# Patient Record
Sex: MALE | Race: OTHER | NOT HISPANIC OR LATINO | URBAN - METROPOLITAN AREA
[De-identification: names, ages, dates, MRNs, and addresses within clinical notes are randomized per-mention and may not be internally consistent; named-entity substitution may affect disease eponyms.]

---

## 2017-02-02 ENCOUNTER — INPATIENT (INPATIENT)
Facility: HOSPITAL | Age: 62
LOS: 14 days | Discharge: HOME | DRG: 217 | End: 2017-02-17
Attending: THORACIC SURGERY (CARDIOTHORACIC VASCULAR SURGERY) | Admitting: INTERNAL MEDICINE
Payer: MEDICAID

## 2017-02-02 VITALS
OXYGEN SATURATION: 96 % | SYSTOLIC BLOOD PRESSURE: 114 MMHG | HEIGHT: 66 IN | RESPIRATION RATE: 26 BRPM | DIASTOLIC BLOOD PRESSURE: 80 MMHG | WEIGHT: 160.06 LBS | HEART RATE: 95 BPM

## 2017-02-02 DIAGNOSIS — I35.1 NONRHEUMATIC AORTIC (VALVE) INSUFFICIENCY: ICD-10-CM

## 2017-02-02 DIAGNOSIS — I50.23 ACUTE ON CHRONIC SYSTOLIC (CONGESTIVE) HEART FAILURE: ICD-10-CM

## 2017-02-02 DIAGNOSIS — I50.21 ACUTE SYSTOLIC (CONGESTIVE) HEART FAILURE: ICD-10-CM

## 2017-02-02 LAB
ALBUMIN SERPL ELPH-MCNC: 3.6 G/DL — SIGNIFICANT CHANGE UP (ref 3.3–5.2)
ALP SERPL-CCNC: 130 U/L — HIGH (ref 40–120)
ALT FLD-CCNC: 338 U/L — HIGH
ANION GAP SERPL CALC-SCNC: 12 MMOL/L — SIGNIFICANT CHANGE UP (ref 5–17)
APTT BLD: 39.6 SEC — HIGH (ref 27.5–37.4)
AST SERPL-CCNC: 101 U/L — HIGH
BASOPHILS # BLD AUTO: 0 K/UL — SIGNIFICANT CHANGE UP (ref 0–0.2)
BASOPHILS NFR BLD AUTO: 0.2 % — SIGNIFICANT CHANGE UP (ref 0–2)
BILIRUB SERPL-MCNC: 1.3 MG/DL — SIGNIFICANT CHANGE UP (ref 0.4–2)
BUN SERPL-MCNC: 15 MG/DL — SIGNIFICANT CHANGE UP (ref 8–20)
CALCIUM SERPL-MCNC: 9.5 MG/DL — SIGNIFICANT CHANGE UP (ref 8.6–10.2)
CHLORIDE SERPL-SCNC: 106 MMOL/L — SIGNIFICANT CHANGE UP (ref 98–107)
CO2 SERPL-SCNC: 22 MMOL/L — SIGNIFICANT CHANGE UP (ref 22–29)
CREAT SERPL-MCNC: 0.88 MG/DL — SIGNIFICANT CHANGE UP (ref 0.5–1.3)
EOSINOPHIL # BLD AUTO: 0.1 K/UL — SIGNIFICANT CHANGE UP (ref 0–0.5)
EOSINOPHIL NFR BLD AUTO: 1.7 % — SIGNIFICANT CHANGE UP (ref 0–5)
GLUCOSE SERPL-MCNC: 90 MG/DL — SIGNIFICANT CHANGE UP (ref 70–115)
HCT VFR BLD CALC: 41.2 % — LOW (ref 42–52)
HGB BLD-MCNC: 13.4 G/DL — LOW (ref 14–18)
INR BLD: 1.16 RATIO — SIGNIFICANT CHANGE UP (ref 0.88–1.16)
LYMPHOCYTES # BLD AUTO: 1.2 K/UL — SIGNIFICANT CHANGE UP (ref 1–4.8)
LYMPHOCYTES # BLD AUTO: 22.3 % — SIGNIFICANT CHANGE UP (ref 20–55)
MAGNESIUM SERPL-MCNC: 1.9 MG/DL — SIGNIFICANT CHANGE UP (ref 1.8–2.5)
MCHC RBC-ENTMCNC: 29.9 PG — SIGNIFICANT CHANGE UP (ref 27–31)
MCHC RBC-ENTMCNC: 32.5 G/DL — SIGNIFICANT CHANGE UP (ref 32–36)
MCV RBC AUTO: 92 FL — SIGNIFICANT CHANGE UP (ref 80–94)
MONOCYTES # BLD AUTO: 0.6 K/UL — SIGNIFICANT CHANGE UP (ref 0–0.8)
MONOCYTES NFR BLD AUTO: 10.7 % — HIGH (ref 3–10)
NEUTROPHILS # BLD AUTO: 3.3 K/UL — SIGNIFICANT CHANGE UP (ref 1.8–8)
NEUTROPHILS NFR BLD AUTO: 64.9 % — SIGNIFICANT CHANGE UP (ref 37–73)
NT-PROBNP SERPL-SCNC: 3089 PG/ML — HIGH (ref 0–300)
PHOSPHATE SERPL-MCNC: 5 MG/DL — HIGH (ref 2.4–4.7)
PLATELET # BLD AUTO: 98 K/UL — LOW (ref 150–400)
POTASSIUM SERPL-MCNC: 4.4 MMOL/L — SIGNIFICANT CHANGE UP (ref 3.5–5.3)
POTASSIUM SERPL-SCNC: 4.4 MMOL/L — SIGNIFICANT CHANGE UP (ref 3.5–5.3)
PROT SERPL-MCNC: 6.5 G/DL — LOW (ref 6.6–8.7)
PROTHROM AB SERPL-ACNC: 12.8 SEC — SIGNIFICANT CHANGE UP (ref 10–13.1)
RBC # BLD: 4.48 M/UL — LOW (ref 4.6–6.2)
RBC # FLD: 15.9 % — HIGH (ref 11–15.6)
SODIUM SERPL-SCNC: 140 MMOL/L — SIGNIFICANT CHANGE UP (ref 135–145)
WBC # BLD: 5.15 K/UL — SIGNIFICANT CHANGE UP (ref 4.8–10.8)
WBC # FLD AUTO: 5.15 K/UL — SIGNIFICANT CHANGE UP (ref 4.8–10.8)

## 2017-02-02 PROCEDURE — 93010 ELECTROCARDIOGRAM REPORT: CPT

## 2017-02-02 PROCEDURE — 93308 TTE F-UP OR LMTD: CPT | Mod: 26

## 2017-02-02 PROCEDURE — 71010: CPT | Mod: 26

## 2017-02-02 PROCEDURE — 99255 IP/OBS CONSLTJ NEW/EST HI 80: CPT

## 2017-02-02 RX ORDER — INFLUENZA VIRUS VACCINE 15; 15; 15; 15 UG/.5ML; UG/.5ML; UG/.5ML; UG/.5ML
0.5 SUSPENSION INTRAMUSCULAR ONCE
Qty: 0 | Refills: 0 | Status: COMPLETED | OUTPATIENT
Start: 2017-02-02 | End: 2017-02-02

## 2017-02-02 RX ORDER — ASPIRIN/CALCIUM CARB/MAGNESIUM 324 MG
81 TABLET ORAL DAILY
Qty: 0 | Refills: 0 | Status: DISCONTINUED | OUTPATIENT
Start: 2017-02-02 | End: 2017-02-05

## 2017-02-02 RX ORDER — PANTOPRAZOLE SODIUM 20 MG/1
40 TABLET, DELAYED RELEASE ORAL
Qty: 0 | Refills: 0 | Status: DISCONTINUED | OUTPATIENT
Start: 2017-02-02 | End: 2017-02-06

## 2017-02-02 RX ORDER — LISINOPRIL 2.5 MG/1
2.5 TABLET ORAL DAILY
Qty: 0 | Refills: 0 | Status: DISCONTINUED | OUTPATIENT
Start: 2017-02-02 | End: 2017-02-03

## 2017-02-02 RX ORDER — FUROSEMIDE 40 MG
40 TABLET ORAL EVERY 12 HOURS
Qty: 0 | Refills: 0 | Status: DISCONTINUED | OUTPATIENT
Start: 2017-02-02 | End: 2017-02-03

## 2017-02-02 RX ORDER — CARVEDILOL PHOSPHATE 80 MG/1
3.12 CAPSULE, EXTENDED RELEASE ORAL EVERY 12 HOURS
Qty: 0 | Refills: 0 | Status: DISCONTINUED | OUTPATIENT
Start: 2017-02-02 | End: 2017-02-04

## 2017-02-02 RX ADMIN — CARVEDILOL PHOSPHATE 3.12 MILLIGRAM(S): 80 CAPSULE, EXTENDED RELEASE ORAL at 17:28

## 2017-02-02 RX ADMIN — Medication 40 MILLIGRAM(S): at 17:28

## 2017-02-02 NOTE — H&P CARDIOLOGY - REVIEW OF SYSTEMS
General: + fatigue, no fevers/chills, no weight change  HEENT: No epistaxis, no tinnitus  GI: No nausea/vomiting, no black/bloody stools, no constipation/diarrhea  : No hematuria, no frequency  M/S: No myalgias, arthralgias, no swollen joints, no back pain  Heme: No bruising/bleeding problems, no anemia, no coagulation disorders  Endo: No hot/cold intolerance, no polyuria, no polydypsia

## 2017-02-02 NOTE — H&P CARDIOLOGY - COMMENTS
60y/o male former smoker with no significant past medical history who went to [facility] for rapidly progressing SOB and occasional chest pain, (CCS class 3 anginal equivalent, NYHA class 3 heart failure).  He states BLE edema, > 3 pillow orthopnea, palpitations, and claudication.  He had SOB and chest pain while in [place] and went to a doctor who gave him a medication that he does not recall the name of.  [place] illness progressed [place] [facility].  Echo showed an EF of 15-20%. 60y/o male former smoker with no significant past medical history who went to [facility] for rapidly progressing SOB and occasional chest pain, (CCS class 3 anginal equivalent, NYHA class 3 heart failure).  He states BLE edema, > 3 pillow orthopnea, palpitations, and claudication.  He had SOB and chest pain while in [place] and went to a doctor who gave him a medication that he does not recall the name of.  The illness progressed [place] to [facility].  Echo showed an EF of 15-20%.  1. LHC  2. RHC 60y/o male former smoker with no significant past medical history who went to [facility] for rapidly progressing SOB and occasional chest pain, (CCS class 3 anginal equivalent, NYHA class 3 heart failure).  He states BLE edema, > 3 pillow orthopnea, palpitations, and claudication.  He had SOB and chest pain while in [place] ([place] [facility]) and went to a doctor who gave him a medication that he does not recall the name of.  The illness progressed and [place] [facility].  Echo showed an EF of 15-20%.  1. Acute HFrEF: R&LHC, ACEI/ARB, beta blocker, diuretics  2. Elevated LFT's  3. Thrombocytopenia 62y/o male former smoker with no significant past medical history who went to [facility] for rapidly progressing SOB and occasional chest pain, (CCS class 3 anginal equivalent, NYHA class 3 heart failure).  He states BLE edema, > 3 pillow orthopnea, palpitations, and claudication.  He had SOB and chest pain while in [place] and went to a doctor who gave him a medication that he does not recall the name of.  The illness progressed and [place] [facility].  Echo showed an EF of 15-20%.  1. Acute HFrEF: R&LHC, ACEI/ARB, beta blocker, diuretics  2. Elevated LFT's (Resolving): Trend LFTs  3. Thrombocytopenia

## 2017-02-02 NOTE — H&P CARDIOLOGY - PMH
Aortic valve insufficiency, unspecified etiology    HFrEF (heart failure with reduced ejection fraction)    Mitral valve insufficiency, unspecified etiology

## 2017-02-02 NOTE — CONSULT NOTE ADULT - SUBJECTIVE AND OBJECTIVE BOX
Patient is a 61y old  Male who presents with a chief complaint of     HPI:  60y/o male former smoker with no significant past medical history who went to [facility] for rapidly progressing SOB and occasional chest pain, (CCS class 3 anginal equivalent, NYHA class 3 heart failure).  He states BLE edema, > 3 pillow orthopnea, palpitations, and claudication.  He had SOB and chest pain while in [place] and went to a doctor who gave him a medication that he does not recall the name of.  The illness progressed [place] [facility].  Echo showed an EF of 15-20%.    Echo: 2017       LVSF: Severely decreased with global hypokinesis and grade 1 LVDD       EF: 15-20%       RVSF: Normal       LA: Normal       RA: Normal       Mitral Valve: Mild to moderate eccentric regurgitation       Aortic Valve: Mild to moderate AR       Tricuspid Valve: Mild TR       Pulmonic Valve: Normal       Pericardium: No significant pericardial effusion (2017 13:07)      PAST MEDICAL & SURGICAL HISTORY:  Mitral valve insufficiency, unspecified etiology  Aortic valve insufficiency, unspecified etiology  HFrEF (heart failure with reduced ejection fraction)  No significant past surgical history      PREVIOUS DIAGNOSTIC TESTING:      ECHO  FINDINGS:  See above    Allergies    No Known Allergies    Intolerances        MEDICATIONS  (STANDING):  carvedilol 3.125milliGRAM(s) Oral every 12 hours  lisinopril 2.5milliGRAM(s) Oral daily  aspirin enteric coated 81milliGRAM(s) Oral daily  furosemide   Injectable 40milliGRAM(s) IV Push every 12 hours    MEDICATIONS  (PRN):    carvedilol 3.125milliGRAM(s) Oral every 12 hours  lisinopril 2.5milliGRAM(s) Oral daily  aspirin enteric coated 81milliGRAM(s) Oral daily  furosemide   Injectable 40milliGRAM(s) IV Push every 12 hours      FAMILY HISTORY:  No pertinent family history in first degree relatives      SOCIAL HISTORY:    CIGARETTES: Former    ALCOHOL: Moderate    REVIEW OF SYSTEMS:  CONSTITUTIONAL: No fever, weight loss, or fatigue  EYES: No eye pain, visual disturbances, or discharge  ENMT:  No difficulty hearing, tinnitus, vertigo; No sinus or throat pain  NECK: No pain or stiffness  RESPIRATORY: No cough, wheezing, chills or hemoptysis; No Shortness of Breath  CARDIOVASCULAR: No chest pain, palpitations, passing out, dizziness, or leg swelling  GASTROINTESTINAL: No abdominal or epigastric pain. No nausea, vomiting, or hematemesis; No diarrhea or constipation. No melena or hematochezia.  GENITOURINARY: No dysuria, frequency, hematuria, or incontinence  NEUROLOGICAL: No headaches, memory loss, loss of strength, numbness, or tremors  SKIN: No itching, burning, rashes, or lesions   LYMPH Nodes: No enlarged glands  ENDOCRINE: No heat or cold intolerance; No hair loss  MUSCULOSKELETAL: No joint pain or swelling; No muscle, back, or extremity pain  PSYCHIATRIC: No depression, anxiety, mood swings, or difficulty sleeping  HEME/LYMPH: No easy bruising, or bleeding gums  ALLERY AND IMMUNOLOGIC: No hives or eczema	    Vital Signs Last 24 Hrs  T(C): 36.4, Max: 36.4 ( @ 13:35)  T(F): 97.6, Max: 97.6 ( @ 13:35)  HR: 89 (89 - 95)  BP: 115/67 (114/80 - 127/62)  BP(mean): 91 (91 - 91)  RR: 18 (18 - 26)  SpO2: 98% (96% - 98%)    Daily Height in cm: 167.64 (2017 13:35)    Daily Weight in k.6 (2017 13:07)    I&O's Detail      PHYSICAL EXAM:  Appearance: Normal, well nourished	  HEENT:   Normal oral mucosa, PERRL, EOMI, sclera non-icteric	  Lymphatic: No cervical lymphadenopathy  Cardiovascular: Normal S1 S2, No JVD, No cardiac murmurs, No carotid bruits, No peripheral edema  Respiratory: Lungs clear to auscultation	  Psychiatry: A & O x 3, Mood & affect appropriate  Gastrointestinal:  Soft, Non-tender, + BS, no bruits	  Skin: No rashes, No ecchymoses, No cyanosis  Neurologic: Grossly non-focal with full strength in all four extremities  Extremities: Normal range of motion, No clubbing, cyanosis or edema  Vascular: Peripheral pulses palpable 2+ bilaterally      INTERPRETATION OF TELEMETRY:    ECG: NSR.  LVH.  NSST changes    LABS:                        13.4   5.15  )-----------( 98       ( 2017 13:52 )             41.2     2017 13:52    140    |  106    |  15.0   ----------------------------<  90     4.4     |  22.0   |  0.88     Ca    9.5        2017 13:52  Phos  5.0       2017 13:52  Mg     1.9       2017 13:52    TPro  6.5    /  Alb  3.6    /  TBili  1.3    /  DBili  x      /  AST  101    /  ALT  338    /  AlkPhos  130    2017 13:52        PT/INR - ( 2017 13:52 )   PT: 12.8 sec;   INR: 1.16 ratio         PTT - ( 2017 13:52 )  PTT:39.6 sec    I&O's Summary    BNPSerum Pro-Brain Natriuretic Peptide: 3089 pg/mL ( @ 13:52)    RADIOLOGY & ADDITIONAL STUDIES:    Assessment:  HPI:  60y/o male former smoker with no significant past medical history who went to [facility] for rapidly progressing SOB and occasional chest pain, (CCS class 3 anginal equivalent, NYHA class 3 heart failure).  He states BLE edema, > 3 pillow orthopnea, palpitations, and claudication.  He had SOB and chest pain while in [place] and went to a doctor who gave him a medication that he does not recall the name of.  [place] progressed [place] [facility].  Echo showed an EF of 15-20%.    Echo: 2017       LVSF: Severely decreased with global hypokinesis and grade 1 LVDD       EF: 15-20%       RVSF: Normal       LA: Normal       RA: Normal       Mitral Valve: Mild to moderate eccentric regurgitation       Aortic Valve: Mild to moderate AR       Tricuspid Valve: Mild TR       Pulmonic Valve: Normal       Pericardium: No significant pericardial effusion (2017 13:07)      Plan:  Right and left cardiac catheterization.  Cardiac catheterization and possible percutaneous intervention recommended.  Risks, benefits, and alternatives reviewed.  Risks including but not limited to MI, death, stroke, bleeding, infection, vessel injury, hematoma, renal failure, allergic reaction, urgent open heart surgery, restenosis and stent thrombosis were reviewed.  All questions answered.  Patient is agreeable to proceed.

## 2017-02-02 NOTE — PROGRESS NOTE ADULT - SUBJECTIVE AND OBJECTIVE BOX
I have erxamined the patient and  have explained risk and benefits . Consent has been attained . I agree with a cardiac catheterization.

## 2017-02-02 NOTE — H&P ADULT. - HISTORY OF PRESENT ILLNESS
is a 62y/o male former smoker with no significant past medical history who went to [facility] for rapidly progressing SOB and occasional chest pain, (CCS class 3 anginal equivalent, NYHA class 3 heart failure). He states BLE edema, > 3 pillow orthopnea, palpitations, and claudication.  He had SOB and chest pain while in [place] ([place] [facility]) and went to a doctor who gave him a medication that he does not recall the name of.  The illness progressed and [place] [facility]. His echo demonstrated a global hypokinesis and it showed an EF of 15-20%. He had a cath which demonstrated normal coronaries and he is being admitted for acute systolic heart failure.    On my exam, he's [place] speaking and appears very comfortable, lying flat in bed without oxygen, and no tachypnea present. The etiology of his cardiomyopathy is unknown.

## 2017-02-02 NOTE — H&P CARDIOLOGY - NEGATIVE NEUROLOGICAL SYMPTOMS
no paresthesias/no loss of consciousness/no transient paralysis/no loss of sensation/no generalized seizures/no hemiparesis/no difficulty walking/no focal seizures/no weakness/no vertigo/no tremors/no headache/no syncope/no facial palsy

## 2017-02-02 NOTE — CONSULT NOTE ADULT - SUBJECTIVE AND OBJECTIVE BOX
History of Present Illness:  61y Male former smoker with no significant past medical history who went to  for rapidly progressing SOB and occasional chest pain, (CCS class 3 anginal equivalent, NYHA class 3 heart failure). He states BLE edema, > 3 pillow orthopnea, palpitations, and claudication.  He had SOB and chest pain while in [place] and went to a doctor who gave him a medication that he does not recall the name of.  The illness progressed and [place] . His echo demonstrated a global hypokinesis and it showed an EF of 15-20%. He had a cath which demonstrated normal coronaries and he is being admitted for acute systolic heart failure.    Past Medical History  Mitral valve insufficiency, unspecified etiology  Aortic valve insufficiency, unspecified etiology  HFrEF (heart failure with reduced ejection fraction)      Past Surgical History  No significant past surgical history      MEDICATIONS  (STANDING):  carvedilol 3.125milliGRAM(s) Oral every 12 hours  lisinopril 2.5milliGRAM(s) Oral daily  aspirin enteric coated 81milliGRAM(s) Oral daily  furosemide   Injectable 40milliGRAM(s) IV Push every 12 hours  pantoprazole    Tablet 40milliGRAM(s) Oral before breakfast    MEDICATIONS  (PRN):    Antiplatelet therapy:                   Aspirin current        Last dose/amt:    Allergies: No Known Allergies      SOCIAL HISTORY:  Smoker: [ x] Yes  [ ] No        PACK YEARS:     30                WHEN QUIT?    ETOH use: [ ] Yes  [x ] No              FREQUENCY / QUANTITY:  Ilicit Drug use:  [ ] Yes  [x ] No  Occupation: "" in the [employer] business  Live with: Alone on the second floor of a house. Sons live nearby  Assist device use: None    Relevant Family History  FAMILY HISTORY:  No pertinent family history in first degree relatives, Father  young in an accident, Mother  of "old age"      Review of Systems  GENERAL:  Fevers[] chills[] sweats[] fatigue[x] weight loss[] weight gain []                                        NEURO:  parathesias[] seizures []  syncope []  confusion []           NEGATIVE                                                                       EYES: glasses[]  blurry vision[]  discharge[] pain[] glaucoma []         NEGATIVE                                                                   ENMT:  difficulty hearing []  vertigo[]  dysphagia[] epistaxis[] recent dental work []   NEGATIVE (Upper and lower dentures)                                   CV:  chest pain[x] palpitations[] DELCID [x] diaphoresis [] edema[]                                                                                             RESPIRATORY:  wheezing[] SOB[x] cough [] sputum[] hemoptysis[]                                                                    GI:  nausea[]  vomiting []  diarrhea[] constipation [] melena []            NEGATIVE                                                            : hematuria[ ]  dysuria[ ] urgency[] incontinence[]                            NEGATIVE                                                                  MUSKULOSKELETAL:  arthritis[ ]  joint swelling [ ] muscle weakness [ ]      NEGATIVE                                                            SKIN/BREAST:  rash[ ] itching [ ]  hair loss[ ] masses[ ]                                     NEGATIVE                                                           PSYCH:  dementia [ ] depresion [ ] anxiety[ ]                                                                NEGATIVE                                                  HEME/LYMPH:  bruises easily[ ] enlarged lymph nodes[ ] tender lymph nodes[ ]          NEGATIVE                                       ENDOCRINE:  cold intolerance[ ] heat intolerance[ ] polydipsia[ ]                                   NEGATIVE                                           PHYSICAL EXAM  Vital Signs Last 24 Hrs  T(C): 36.4, Max: 36.4 (- @ 13:35)  T(F): 97.6, Max: 97.6 ( @ 13:35)  HR: 92 (86 - 95)  BP: 128/76 (108/63 - 128/76)  BP(mean): 91 (91 - 91)  RR: 18 (18 - 26)  SpO2: 98% (96% - 98%)    Telemetry: SR with frequent PVCs    General: Well nourished, well developed, no acute distress.                                                         Neuro: Normal exam oriented to person/place & time with no focal motor or sensory  deficits.                    Eyes: Normal exam of conjunctiva & lids, pupils equally reactive.   ENT: Normal exam of nasal/oral mucosa with absence of cyanosis. - upper and lower dentures  Neck: Normal exam of jugular veins, trachea & thyroid.   Chest: Normal lung exam with good air movement absence of wheezes, rales, or rhonchi:                                                                          CV:  Auscultation: normal [x ] S3[ ] S4[ ] Irregular [ ] Rub[ ] Clicks[ ]  Murmurs none:[ ]systolic [x ]  diastolic [ ] holosystolic [ ]  Carotids: No Bruits[ x - right referred murmur] Other____________ Abdominal Aorta: normal [x ] nonpalpable[ ]                                                                         GI: Normal exam of abdomen, liver & spleen with no noted masses or tenderness.                                                                                              Extremities: Normal no evidence of cyanosis or deformity Edema: none[x ]trace[ ]1+[ ]2+[ ]3+[ ]4+[ ]  Lower Extremity Pulses: Right[+ ] Left[ +]Varicosities[ ]  SKIN : Normal exam to inspection & palpation.                                                           LABS:                        13.4   5.15  )-----------( 98       ( 2017 13:52 )             41.2     2017 13:52    140    |  106    |  15.0   ----------------------------<  90     4.4     |  22.0   |  0.88     Ca    9.5        2017 13:52  Phos  5.0       2017 13:52  Mg     1.9       2017 13:52    TPro  6.5    /  Alb  3.6    /  TBili  1.3    /  DBili  x      /  AST  101    /  ALT  338    /  AlkPhos  130    2017 13:52    PT/INR - ( 2017 13:52 )   PT: 12.8 sec;   INR: 1.16 ratio         PTT - ( 2017 13:52 )  PTT:39.6 sec      Cardiac Cath: 17  VENTRICLES: Global left ventricular function was severely depressed. EF  estimated was 20 %.  VALVES: AORTIC VALVE: There was moderate to severe aortic insufficiency.  MITRAL VALVE: The mitral valve exhibited mild regurgitation.  CORONARY VESSELS: The coronary circulation is right dominant.  LM:   --  LM: Angiography showed minor luminal irregularities with no flow  limiting lesions.  LAD:   --  Proximal LAD: Angiography showed minor luminal irregularities  with no flow limiting lesions.  CX:   --  Proximal circumflex: Angiography showed mild atherosclerosis with  no flow limiting lesions.  RCA:   --  Mid RCA: There was a tubular 30 % stenosis. The lesion was  moderately calcified.  AORTA: Ascending aorta: The segment was moderately dilated.  COMPLICATIONS: No complications occurred during the cath lab visit.  DIAGNOSTIC RECOMMENDATIONS: Will repeat echo here. May need a JARVIS to  further evaluate the ascending aorta as well as the severity of the aortic  insufficiency, as well as a CTA. Will discuss case with Dr Carter.    TTE / JARVIS: Pending    Assessment:  61y Male presents with acute systolic heart failure due to aortic insufficiency and reduced ejection fraction.      Plan:  JARVIS tomorrow to eval aortic aneurysm and valves  CT Chest with IV contrast to eval aortic aneurysm  Check carotids and preop labs  IV lasix for acute heart failure/fluid overload  Coreg and Lisinopril for acute systolic heart failure  D/W Dr Carter  Will continue to follow

## 2017-02-02 NOTE — PROGRESS NOTE ADULT - SUBJECTIVE AND OBJECTIVE BOX
Subjective:  61y  Male who had a left and right heart catheterization which showed:       LM: Mild luminal irregularities with no flow limiting disturbances.       LAD: Mild luminal irregularities with no flow limiting disturbances.       LCX: Mild luminal irregularities with no flow limiting disturbances.       RCA: 30% mid stenosis    Right Heart Pressures:       RA: 8       RV: 60/17       PA: 67/39/50       PCWP: 25       CO: 2.94       CI: 1.57      PAST MEDICAL & SURGICAL HISTORY:  Mitral valve insufficiency, unspecified etiology  Aortic valve insufficiency, unspecified etiology  HFrEF (heart failure with reduced ejection fraction)  No significant past surgical history    FAMILY HISTORY:  No pertinent family history in first degree relatives    MEDICATIONS  (STANDING):  carvedilol 3.125milliGRAM(s) Oral every 12 hours  lisinopril 2.5milliGRAM(s) Oral daily  aspirin enteric coated 81milliGRAM(s) Oral daily          HPI:  60y/o male former smoker with no significant past medical history who went to [facility] for rapidly progressing SOB and occasional chest pain, (CCS class 3 anginal equivalent, NYHA class 3 heart failure).  He states BLE edema, > 3 pillow orthopnea, palpitations, and claudication.  He had SOB and chest pain while in [place] and went to a doctor who gave him a medication that he does not recall the name of.  The illness progressed and [place] went to [facility].  Echo showed an EF of 15-20%.    Echo: 1/24/2017       LVSF: Severely decreased with global hypokinesis and grade 1 LVDD       EF: 15-20%       RVSF: Normal       LA: Normal       RA: Normal       Mitral Valve: Mild to moderate eccentric regurgitation       Aortic Valve: Mild to moderate AR       Tricuspid Valve: Mild TR       Pulmonic Valve: Normal       Pericardium: No significant pericardial effusion (02 Feb 2017 13:07)    General: No fatigue, no fevers/chills  Respiratory: No dyspnea, no cough, no wheeze  CV: No chest pain, no palpitations  Abd: No nausea  Neuro: No headache, no dizziness  No Known Allergies      Objective:  Vital Signs Last 24 Hrs  T(C): 36.4, Max: 36.4 (02-02 @ 13:35)  T(F): 97.6, Max: 97.6 (02-02 @ 13:35)  HR: 95 (93 - 95)  BP: 127/62 (114/80 - 127/62)  BP(mean): 91 (91 - 91)  RR: 20 (18 - 26)  SpO2: 97% (96% - 97%)  CM: SR  Neuro: A&OX3, CN 2-12 intact  HEENT: NC, AT  Lungs: CTA B/L  CV: S1, S2, no murmur, RRR  Abd: Soft  Right Groin: Soft, no bleeding, no hematoma  Extremity: + distal pulses                          13.4   5.15  )-----------( 98       ( 02 Feb 2017 13:52 )             41.2     02 Feb 2017 13:52    140    |  106    |  15.0   ----------------------------<  90     4.4     |  22.0   |  0.88     Ca    9.5        02 Feb 2017 13:52  Phos  5.0       02 Feb 2017 13:52  Mg     1.9       02 Feb 2017 13:52    TPro  6.5    /  Alb  3.6    /  TBili  1.3    /  DBili  x      /  AST  101    /  ALT  338    /  AlkPhos  130    02 Feb 2017 13:52    PT/INR - ( 02 Feb 2017 13:52 )   PT: 12.8 sec;   INR: 1.16 ratio         PTT - ( 02 Feb 2017 13:52 )  PTT:39.6 sec

## 2017-02-02 NOTE — H&P CARDIOLOGY - HISTORY OF PRESENT ILLNESS
60y/o male with no significant past medical history who went to [facility] for rapidly progressing SOB     Echo: 1/24/2017       LVSF: Severely decreased with global hypokinesis and grade 1 LVDD       EF: 15-20%       RVSF: Normal       LA: Normal       RA: Normal       Mitral Valve: Mild to moderate eccentric regurgitation       Aortic Valve: Mild to moderate AR       Tricuspid Valve: Mild TR       Pulmonic Valve: Normal       Pericardium: No significant pericardial effusion 62y/o male former smoker with no significant past medical history who went to [facility] for rapidly progressing SOB and occasional chest pain, (CCS class 3 anginal equivalent, NYHA class 3 heart failure).  He states BLE edema, > 3 pillow orthopnea, palpitations, and claudication.  He had SOB and chest pain while in [place] and went to a doctor who gave him a medication that he does not recall the name of.  The illness progressed and [place] the [place] he went to [facility].  Echo showed an EF of 15-20%.    Echo: 1/24/2017       LVSF: Severely decreased with global hypokinesis and grade 1 LVDD       EF: 15-20%       RVSF: Normal       LA: Normal       RA: Normal       Mitral Valve: Mild to moderate eccentric regurgitation       Aortic Valve: Mild to moderate AR       Tricuspid Valve: Mild TR       Pulmonic Valve: Normal       Pericardium: No significant pericardial effusion

## 2017-02-02 NOTE — PROGRESS NOTE ADULT - ASSESSMENT
61y Male   Procedure: Left heart catheterization  Pre-op diagnosis: Acute HFrEF  Post-op diagnosis: Acute HFrEF, nonischemic cardiomyopathy, AI    1. JARVIS in AM (NPO after midnight)    2. Admit to [facility] [place] (Hospitalist service)    3. Bedrest for 2 hours    4. CT surgery evaluation for AVR    5. Heart Failure:       Heart failure teaching       Daily weights       Strict I&O's       Beta Blocker: Coreg 3.125mg every 12 hours       ACEI/ARB: Lisinopril 2.5mg daily       Diuretic: Lasix 40mg every 12 hours       LV Function Assessment: Echo tonight

## 2017-02-03 DIAGNOSIS — I42.8 OTHER CARDIOMYOPATHIES: ICD-10-CM

## 2017-02-03 LAB
ALBUMIN SERPL ELPH-MCNC: 3.6 G/DL — SIGNIFICANT CHANGE UP (ref 3.3–5.2)
ALP SERPL-CCNC: 133 U/L — HIGH (ref 40–120)
ALT FLD-CCNC: 272 U/L — HIGH
ANION GAP SERPL CALC-SCNC: 13 MMOL/L — SIGNIFICANT CHANGE UP (ref 5–17)
APPEARANCE UR: CLEAR — SIGNIFICANT CHANGE UP
AST SERPL-CCNC: 72 U/L — HIGH
BACTERIA # UR AUTO: NEGATIVE — SIGNIFICANT CHANGE UP
BILIRUB DIRECT SERPL-MCNC: 0.4 MG/DL — HIGH (ref 0–0.3)
BILIRUB INDIRECT FLD-MCNC: 0.6 MG/DL — SIGNIFICANT CHANGE UP (ref 0.2–1)
BILIRUB SERPL-MCNC: 1 MG/DL — SIGNIFICANT CHANGE UP (ref 0.4–2)
BILIRUB UR-MCNC: NEGATIVE — SIGNIFICANT CHANGE UP
BUN SERPL-MCNC: 22 MG/DL — HIGH (ref 8–20)
CALCIUM SERPL-MCNC: 9.4 MG/DL — SIGNIFICANT CHANGE UP (ref 8.6–10.2)
CHLORIDE SERPL-SCNC: 103 MMOL/L — SIGNIFICANT CHANGE UP (ref 98–107)
CHOLEST SERPL-MCNC: 169 MG/DL — SIGNIFICANT CHANGE UP (ref 110–199)
CO2 SERPL-SCNC: 25 MMOL/L — SIGNIFICANT CHANGE UP (ref 22–29)
COLOR SPEC: YELLOW — SIGNIFICANT CHANGE UP
CREAT SERPL-MCNC: 1.17 MG/DL — SIGNIFICANT CHANGE UP (ref 0.5–1.3)
DIFF PNL FLD: ABNORMAL
EPI CELLS # UR: NEGATIVE — SIGNIFICANT CHANGE UP
GLUCOSE SERPL-MCNC: 95 MG/DL — SIGNIFICANT CHANGE UP (ref 70–115)
GLUCOSE UR QL: NEGATIVE MG/DL — SIGNIFICANT CHANGE UP
HBA1C BLD-MCNC: 5.2 % — SIGNIFICANT CHANGE UP (ref 4–5.6)
HCT VFR BLD CALC: 41.9 % — LOW (ref 42–52)
HDLC SERPL-MCNC: 24 MG/DL — LOW
HGB BLD-MCNC: 13.5 G/DL — LOW (ref 14–18)
INR BLD: 1.12 RATIO — SIGNIFICANT CHANGE UP (ref 0.88–1.16)
KETONES UR-MCNC: NEGATIVE — SIGNIFICANT CHANGE UP
LEUKOCYTE ESTERASE UR-ACNC: NEGATIVE — SIGNIFICANT CHANGE UP
LIPID PNL WITH DIRECT LDL SERPL: 112 MG/DL — SIGNIFICANT CHANGE UP
MAGNESIUM SERPL-MCNC: 1.9 MG/DL — SIGNIFICANT CHANGE UP (ref 1.8–2.5)
MCHC RBC-ENTMCNC: 29.5 PG — SIGNIFICANT CHANGE UP (ref 27–31)
MCHC RBC-ENTMCNC: 32.2 G/DL — SIGNIFICANT CHANGE UP (ref 32–36)
MCV RBC AUTO: 91.7 FL — SIGNIFICANT CHANGE UP (ref 80–94)
MRSA PCR RESULT.: SIGNIFICANT CHANGE UP
NITRITE UR-MCNC: NEGATIVE — SIGNIFICANT CHANGE UP
NT-PROBNP SERPL-SCNC: 3347 PG/ML — HIGH (ref 0–300)
PH UR: 5 — SIGNIFICANT CHANGE UP (ref 4.8–8)
PHOSPHATE SERPL-MCNC: 5.5 MG/DL — HIGH (ref 2.4–4.7)
PLATELET # BLD AUTO: 109 K/UL — LOW (ref 150–400)
POTASSIUM SERPL-MCNC: 3.9 MMOL/L — SIGNIFICANT CHANGE UP (ref 3.5–5.3)
POTASSIUM SERPL-SCNC: 3.9 MMOL/L — SIGNIFICANT CHANGE UP (ref 3.5–5.3)
PREALB SERPL-MCNC: 18 MG/DL — SIGNIFICANT CHANGE UP (ref 18–38)
PROT SERPL-MCNC: 6.6 G/DL — SIGNIFICANT CHANGE UP (ref 6.6–8.7)
PROT UR-MCNC: NEGATIVE MG/DL — SIGNIFICANT CHANGE UP
PROTHROM AB SERPL-ACNC: 12.3 SEC — SIGNIFICANT CHANGE UP (ref 10–13.1)
RBC # BLD: 4.57 M/UL — LOW (ref 4.6–6.2)
RBC # FLD: 15.9 % — HIGH (ref 11–15.6)
RBC CASTS # UR COMP ASSIST: SIGNIFICANT CHANGE UP /HPF (ref 0–4)
S AUREUS DNA NOSE QL NAA+PROBE: SIGNIFICANT CHANGE UP
SODIUM SERPL-SCNC: 141 MMOL/L — SIGNIFICANT CHANGE UP (ref 135–145)
SP GR SPEC: 1.01 — SIGNIFICANT CHANGE UP (ref 1.01–1.02)
TOTAL CHOLESTEROL/HDL RATIO MEASUREMENT: 7 RATIO — SIGNIFICANT CHANGE UP (ref 3.4–9.6)
TRIGL SERPL-MCNC: 164 MG/DL — SIGNIFICANT CHANGE UP (ref 10–200)
TSH SERPL-MCNC: 3.14 UIU/ML — SIGNIFICANT CHANGE UP (ref 0.27–4.2)
UROBILINOGEN FLD QL: 1 MG/DL
WBC # BLD: 5.79 K/UL — SIGNIFICANT CHANGE UP (ref 4.8–10.8)
WBC # FLD AUTO: 5.79 K/UL — SIGNIFICANT CHANGE UP (ref 4.8–10.8)
WBC UR QL: SIGNIFICANT CHANGE UP

## 2017-02-03 PROCEDURE — 71260 CT THORAX DX C+: CPT | Mod: 26

## 2017-02-03 PROCEDURE — 76376 3D RENDER W/INTRP POSTPROCES: CPT | Mod: 26

## 2017-02-03 PROCEDURE — 93320 DOPPLER ECHO COMPLETE: CPT | Mod: 26

## 2017-02-03 PROCEDURE — 93312 ECHO TRANSESOPHAGEAL: CPT | Mod: 26

## 2017-02-03 PROCEDURE — 93880 EXTRACRANIAL BILAT STUDY: CPT | Mod: 26

## 2017-02-03 PROCEDURE — 93010 ELECTROCARDIOGRAM REPORT: CPT

## 2017-02-03 PROCEDURE — 93325 DOPPLER ECHO COLOR FLOW MAPG: CPT | Mod: 26

## 2017-02-03 RX ORDER — FUROSEMIDE 40 MG
40 TABLET ORAL DAILY
Qty: 0 | Refills: 0 | Status: DISCONTINUED | OUTPATIENT
Start: 2017-02-03 | End: 2017-02-04

## 2017-02-03 RX ORDER — CHLORHEXIDINE GLUCONATE 213 G/1000ML
1 SOLUTION TOPICAL
Qty: 0 | Refills: 0 | Status: DISCONTINUED | OUTPATIENT
Start: 2017-02-03 | End: 2017-02-06

## 2017-02-03 RX ORDER — POTASSIUM CHLORIDE 20 MEQ
20 PACKET (EA) ORAL ONCE
Qty: 0 | Refills: 0 | Status: DISCONTINUED | OUTPATIENT
Start: 2017-02-03 | End: 2017-02-03

## 2017-02-03 RX ORDER — MAGNESIUM SULFATE 500 MG/ML
2 VIAL (ML) INJECTION ONCE
Qty: 0 | Refills: 0 | Status: COMPLETED | OUTPATIENT
Start: 2017-02-03 | End: 2017-02-04

## 2017-02-03 RX ORDER — CHLORHEXIDINE GLUCONATE 213 G/1000ML
15 SOLUTION TOPICAL
Qty: 0 | Refills: 0 | Status: DISCONTINUED | OUTPATIENT
Start: 2017-02-03 | End: 2017-02-06

## 2017-02-03 RX ORDER — LISINOPRIL 2.5 MG/1
2.5 TABLET ORAL DAILY
Qty: 0 | Refills: 0 | Status: DISCONTINUED | OUTPATIENT
Start: 2017-02-03 | End: 2017-02-04

## 2017-02-03 RX ORDER — ENOXAPARIN SODIUM 100 MG/ML
40 INJECTION SUBCUTANEOUS DAILY
Qty: 0 | Refills: 0 | Status: DISCONTINUED | OUTPATIENT
Start: 2017-02-03 | End: 2017-02-05

## 2017-02-03 RX ADMIN — LISINOPRIL 2.5 MILLIGRAM(S): 2.5 TABLET ORAL at 06:36

## 2017-02-03 RX ADMIN — CARVEDILOL PHOSPHATE 3.12 MILLIGRAM(S): 80 CAPSULE, EXTENDED RELEASE ORAL at 06:37

## 2017-02-03 RX ADMIN — PANTOPRAZOLE SODIUM 40 MILLIGRAM(S): 20 TABLET, DELAYED RELEASE ORAL at 06:36

## 2017-02-03 RX ADMIN — CARVEDILOL PHOSPHATE 3.12 MILLIGRAM(S): 80 CAPSULE, EXTENDED RELEASE ORAL at 17:38

## 2017-02-03 RX ADMIN — Medication 40 MILLIGRAM(S): at 17:38

## 2017-02-03 RX ADMIN — ENOXAPARIN SODIUM 40 MILLIGRAM(S): 100 INJECTION SUBCUTANEOUS at 17:37

## 2017-02-03 RX ADMIN — Medication 40 MILLIGRAM(S): at 06:39

## 2017-02-03 RX ADMIN — CHLORHEXIDINE GLUCONATE 15 MILLILITER(S): 213 SOLUTION TOPICAL at 18:30

## 2017-02-03 RX ADMIN — Medication 81 MILLIGRAM(S): at 13:17

## 2017-02-03 NOTE — PROGRESS NOTE ADULT - ASSESSMENT
61 year old male with a  recently was admitted to [facility] on 1/21 for complains of SOB & was transferred to [facility] for cardiac cath on 2/2, cath indicated no coronary disease, mild MR, moderate to severe aortic insufficiency.    Plan:   Pre-op for OHS, scheduled for 2/6/17.  Continue lasix & coreg for EF of 25% & CHF.  Lovenox for DVT prophylaxis.  Protonix for GI prophylaxis.  Discussed plan with Dr. Carter.

## 2017-02-03 NOTE — PROGRESS NOTE ADULT - SUBJECTIVE AND OBJECTIVE BOX
Chart reviewed.   Agree with H&P.  Asked by Dr. Brooks to perform a JARVIS with AI, MR and TR.   We will use [place]  for consent.

## 2017-02-03 NOTE — PROGRESS NOTE ADULT - SUBJECTIVE AND OBJECTIVE BOX
Subjective: Czech  phone at bedside, Pt. denies any chest pain or SOB, NAD noted.    Tele: Sinus Rhythm   Vital Signs Last 24 Hrs  T(C): 36.6, Max: 36.9 (02-02 @ 18:25)  T(F): 97.8, Max: 98.4 (02-02 @ 18:25)  HR: 86 (82 - 101)  BP: 110/60 (96/58 - 128/76)  RR: 12 (12 - 20)  SpO2: 97% (95% - 99%)                  LV EF: 25%    03 Feb 2017 07:13    141    |  103    |  22.0   ----------------------------<  95     3.9     |  25.0   |  1.17     Ca    9.4        03 Feb 2017 07:13  Phos  5.5       03 Feb 2017 07:13  Mg     1.9       03 Feb 2017 07:13    TPro  6.6    /  Alb  3.6    /  TBili  1.0    /  DBili  0.4    /  AST  72     /  ALT  272    /  AlkPhos  133    03 Feb 2017 07:13                             13.5   5.79  )-----------( 109      ( 03 Feb 2017 07:13 )             41.9       PT/INR - ( 03 Feb 2017 07:13 )   PT: 12.3 sec;   INR: 1.12 ratio         PTT - ( 02 Feb 2017 13:52 )  PTT:39.6 sec        CAPILLARY BLOOD GLUCOSE  112 (02 Feb 2017 22:24)              I&O's Detail  I & Os for 24h ending 03 Feb 2017 07:00  =============================================  IN:    Oral Fluid: 120 ml    Total IN: 120 ml  ---------------------------------------------  OUT:    Voided: 2645 ml    Total OUT: 2645 ml  ---------------------------------------------  Total NET: -2525 ml    I & Os for current day (as of 03 Feb 2017 17:01)  =============================================  IN:    Total IN: 0 ml  ---------------------------------------------  OUT:    Voided: 1625 ml    Total OUT: 1625 ml  ---------------------------------------------  Total NET: -1625 ml          MEDICATIONS  (STANDING):  carvedilol 3.125milliGRAM(s) Oral every 12 hours  aspirin enteric coated 81milliGRAM(s) Oral daily  furosemide   Injectable 40milliGRAM(s) IV Push every 12 hours  pantoprazole    Tablet 40milliGRAM(s) Oral before breakfast  influenza   Vaccine 0.5milliLiter(s) IntraMuscular once  enoxaparin Injectable 40milliGRAM(s) SubCutaneous daily          Physical Exam:  Neuro: A&Ox4, no focal deficit noted.  Pulm: Clear, diminished at the bases.   CV: RRR, +S1, +S2  Abd: soft, non-tender, non-distended, +BS  Extremities: MAEx4, +PP, no edema noted, - calf tenderness.      PAST MEDICAL & SURGICAL HISTORY:  Mitral valve insufficiency, unspecified etiology  Aortic valve insufficiency, unspecified etiology  HFrEF (heart failure with reduced ejection fraction)  No significant past surgical history                       Plan:

## 2017-02-03 NOTE — PROGRESS NOTE ADULT - SUBJECTIVE AND OBJECTIVE BOX
INTERVAL HISTORY:  Feels better  No chest pain or shortness of breath    	  MEDICATIONS:  carvedilol 3.125milliGRAM(s) Oral every 12 hours  furosemide   Injectable 40milliGRAM(s) IV Push daily  lisinopril 2.5milliGRAM(s) Oral daily          pantoprazole    Tablet 40milliGRAM(s) Oral before breakfast      aspirin enteric coated 81milliGRAM(s) Oral daily  influenza   Vaccine 0.5milliLiter(s) IntraMuscular once  enoxaparin Injectable 40milliGRAM(s) SubCutaneous daily  chlorhexidine 4% Liquid 1Application(s) Topical two times a day  chlorhexidine 0.12% Liquid 15milliLiter(s) Swish and Spit two times a day        PHYSICAL EXAM:    T(C): 36.8, Max: 36.8 ( @ 08:15)  HR: 87 (78 - 101)  BP: 110/58 (96/58 - 118/64)  RR: 18 (12 - 20)  SpO2: 97% (95% - 99%)  Wt(kg): --    I&O's Summary  I & Os for 24h ending 2017 07:00  =============================================  IN: 120 ml / OUT: 2645 ml / NET: -2525 ml    I & Os for current day (as of 2017 21:56)  =============================================  IN: 0 ml / OUT: 1925 ml / NET: -1925 ml      Daily     Daily Weight in k.1 (2017 05:20)    Appearance: Normal	  HEENT:   Normal oral mucosa, PERRL, EOMI	  Lymphatic: No lymphadenopathy  Cardiovascular: Normal S1 S2, No JVD, 2/6 systolic murmurs, No edema  Respiratory: Lungs clear to auscultation	  Psychiatry: A & O x 3, Mood & affect appropriate  Gastrointestinal:  Soft, Non-tender, + BS	  Skin: No rashes, No ecchymoses, No cyanosis  Neurologic: Non-focal  Extremities: Normal range of motion, No clubbing, cyanosis or edema  Vascular: Peripheral pulses palpable 2+ bilaterally  Procedure Site: No hematoma or bleeding                          13.5   5.79  )-----------( 109      ( 2017 07:13 )             41.9     2017 07:13    141    |  103    |  22.0   ----------------------------<  95     3.9     |  25.0   |  1.17     Ca    9.4        2017 07:13  Phos  5.5       2017 07:13  Mg     1.9       2017 07:13    TPro  6.6    /  Alb  3.6    /  TBili  1.0    /  DBili  0.4    /  AST  72     /  ALT  272    /  AlkPhos  133    2017 07:13    proBNP: Serum Pro-Brain Natriuretic Peptide: 3347 pg/mL ( @ 07:13)    Lipid Profile:   HgA1c: Hemoglobin A1C, Whole Blood: 5.2 % ( @ 07:13)      ASSESSMENT/PLAN: 	  JARVIS shows severe AI and MR  CT shows a markedly dilated ascending aorta  Dr marinelli is planning MVR/AVR and ascending aortic repair.  Treat with Coreg and ACE inhibitor  Decrease Lasix to once daily  Monitor LFT's and Platelet count  consider hematology consult given need for open heart surgery.

## 2017-02-03 NOTE — PROGRESS NOTE ADULT - SUBJECTIVE AND OBJECTIVE BOX
Peggy Complaint:      HPI:   is a 62y/o male former smoker with no significant past medical history who went to [facility] for rapidly progressing SOB and occasional chest pain, (CCS class 3 anginal equivalent, NYHA class 3 heart failure). He states BLE edema, > 3 pillow orthopnea, palpitations, and claudication.  He had SOB and chest pain while in [place] and went to a doctor who gave him a medication that he does not recall the name of.  The illness progressed and when he returned to the [place] he went to [facility]. His echo demonstrated a global hypokinesis and it showed an EF of 15-20%. He had a cath which demonstrated normal coronaries and he is being admitted for acute systolic heart failure.    Interpter provider via phone.      · Subjective and Objective: 	  Subjective:  61y  Male who had a left and right heart catheterization which showed:       LM: Mild luminal irregularities with no flow limiting disturbances.       LAD: Mild luminal irregularities with no flow limiting disturbances.       LCX: Mild luminal irregularities with no flow limiting disturbances.       RCA: 30% mid stenosis    Right Heart Pressures:       RA: 8       RV: 60/17       PA: 67/39/50       PCWP: 25       CO: 2.94       CI: 1.57        Acute on chronic systolic congestive heart failure  H/o or current diagnosis of HF- no contraindication to ACEI/ARBs  H/o or current diagnosis of HF- ACEI/ARB contraindication unknown  No pertinent family history in first degree relatives  Handoff  MEWS Score  Mitral valve insufficiency, unspecified etiology  Aortic valve insufficiency, unspecified etiology  HFrEF (heart failure with reduced ejection fraction)  HFrEF (heart failure with reduced ejection fraction)  Aortic valve insufficiency, unspecified etiology  Acute systolic heart failure  No significant past surgical history  ACUTE ON CHRONIC SYSTOLIC (CON  Aortic valve insufficiency, unspecified etiology  Nonischemic cardiomyopathy      REVIEW OF SYSTEMS    General: Denies fever, chills, pain, no discomfort  	  Respiratory and Thorax:  Denies cough, sob, or any discomfort  	  Cardiovascular:  Denies chest pain, palpitations or any discomfort	    Gastrointestinal:  Denies n/v/d, constipation, or any discomfort    Genitourinary:  Denies frequency, burning, or pain    Musculoskeletal:  Denies joint pain, swelling, or any discomfort     Neurological:  Denies headache, dizziness blurred vision, numbing or tingling    Hematology  Alfonso bleeding o swelling    Allergic/Immunologic:	  MEDICATIONS:  carvedilol 3.125milliGRAM(s) Oral every 12 hours  lisinopril 2.5milliGRAM(s) Oral daily  furosemide   Injectable 40milliGRAM(s) IV Push every 12 hours  pantoprazole    Tablet 40milliGRAM(s) Oral before breakfast  aspirin enteric coated 81milliGRAM(s) Oral daily  influenza   Vaccine 0.5milliLiter(s) IntraMuscular once        PHYSICAL EXAM:    T(C): 36.8, Max: 36.9 ( @ 18:25)  HR: 85 (85 - 101)  BP: 96/58 (96/58 - 128/76)  RR: 18 (16 - 26)  SpO2: 98% (95% - 99%)  Wt(kg): --    I&O's Summary  I & Os for 24h ending 2017 07:00  =============================================  IN: 120 ml / OUT: 2645 ml / NET: -2525 ml    I & Os for current day (as of 2017 13:04)  =============================================  IN: 0 ml / OUT: 1625 ml / NET: -1625 ml      Daily Height in cm: 167.64 (2017 13:35)    Daily Weight in k.1 (2017 05:20)    Appearance: Normal	  HEENT:   Normal oral mucosa, PERRL, EOMI	  Lymphatic: No lymphadenopathy  Cardiovascular:  No edema  Respiratory: RR wnl for rate and rhythm, color pink,  	  Psychiatry: A & O x 3, Mood & affect appropriate  Gastrointestinal:  Soft, Non-tender, + BS	  Skin: No rashes, No ecchymoses, No cyanosis  Neurologic: Non-focal  Extremities: Normal range of motion, No clubbing, cyanosis or edema  Vascular: Peripheral pulses palpable 2+ bilaterally  Procedure Site:  Right grion no bleeding, no pain, no bruising, no hematoma, +PP, no edema.        TELEMETRY: 	Sr with PVC's                  13.5   5.79  )-----------( 109      ( 2017 07:13 )             41.9     2017 07:13    141    |  103    |  22.0   ----------------------------<  95     3.9     |  25.0   |  1.17     Ca    9.4        2017 07:13  Phos  5.5       2017 07:13  Mg     1.9       2017 07:13    TPro  6.6    /  Alb  3.6    /  TBili  1.0    /  DBili  0.4    /  AST  72     /  ALT  272    /  AlkPhos  133    2017 07:13    proBNP: Serum Pro-Brain Natriuretic Peptide: 3347 pg/mL ( @ 07:13)  Serum Pro-Brain Natriuretic Peptide: 3089 pg/mL ( @ 13:52)    HgA1c: Hemoglobin A1C, Whole Blood: 5.2 % ( @ 07:13)      ASSESSMENT/PLAN: 	  PTT - ( 2017 13:52 )  PTT:39.6 sec    Assessment and Plan:   · Assessment		  61y Male   Procedure: Left heart catheterization  Pre-op diagnosis: Acute HFrEF  Post-op diagnosis: Acute HFrEF, nonischemic cardiomyopathy, AI  Continue current plan of care.      CT surgery evaluation for AVR    Heart Failure:       Heart failure teaching       Daily weights       Strict I&O's       Beta Blocker: Coreg 3.125mg every 12 hours       ACEI/ARB: Lisinopril 2.5mg daily       Diuretic: Lasix 40mg every 12 hours       LV Function Assessment:  JARVIS done, results pending.

## 2017-02-03 NOTE — PROGRESS NOTE ADULT - SUBJECTIVE AND OBJECTIVE BOX
is a 62y/o male former smoker with no significant past medical history who went to [facility] for rapidly progressing SOB and occasional chest pain, (CCS class 3 anginal equivalent, NYHA class 3 heart failure). He states BLE edema, > 3 pillow orthopnea, palpitations, and claudication.  He was transferred here and his echo demonstrated a global hypokinesis and it showed an EF of 15-20%. He had a cath which demonstrated normal coronaries and he is being admitted for acute systolic heart failure. His valves are concerning as he had a cath which demonstrated AI and he just had a JARVIS w/CT surgery following for further recommendations. Clinically, he has responded to IV lasix treatments and is able to lie flat, has no crackles on exam.     Summary:   REVIEW OF SYSTEMS    General: "I'm doing okay"	    Skin/Breast:  	  Ophthalmologic:  	  ENMT:	    Respiratory and Thorax:  	  Cardiovascular:	    Gastrointestinal:	    Genitourinary:	    Musculoskeletal:	    Neurological:	    Psychiatric:	    Hematology/Lymphatics:	    Endocrine:	    Allergic/Immunologic:	  Vital Signs Last 24 Hrs  T(C): 36.8, Max: 36.9 (02-02 @ 18:25)  T(F): 98.3, Max: 98.4 (02-02 @ 18:25)  HR: 86 (82 - 101)  BP: 110/60 (96/58 - 128/76)  BP(mean): --  RR: 12 (12 - 20)  SpO2: 97% (95% - 99%)  PHYSICAL EXAM:  GEN: middle aged male, NAD, comfortable, speaking full sentences, lying in bed flat  HEENT: dry MM  CVS: s1S2  PULM: good breath sounds no crackles  ABD: soft, nontender, no rebound  EXTREM: no edema  NEURO: intact, mentating  PSYCH  SKIN:                          13.5   5.79  )-----------( 109      ( 03 Feb 2017 07:13 )             41.9     03 Feb 2017 07:13    141    |  103    |  22.0   ----------------------------<  95     3.9     |  25.0   |  1.17     Ca    9.4        03 Feb 2017 07:13  Phos  5.5       03 Feb 2017 07:13  Mg     1.9       03 Feb 2017 07:13    TPro  6.6    /  Alb  3.6    /  TBili  1.0    /  DBili  0.4    /  AST  72     /  ALT  272    /  AlkPhos  133    03 Feb 2017 07:13    LIVER FUNCTIONS - ( 03 Feb 2017 07:13 )  Alb: 3.6 g/dL / Pro: 6.6 g/dL / ALK PHOS: 133 U/L / ALT: 272 U/L / AST: 72 U/L / GGT: x           PT/INR - ( 03 Feb 2017 07:13 )   PT: 12.3 sec;   INR: 1.12 ratio         PTT - ( 02 Feb 2017 13:52 )  PTT:39.6 sec    Radiology:     MEDICATIONS  (STANDING):  carvedilol 3.125milliGRAM(s) Oral every 12 hours  lisinopril 2.5milliGRAM(s) Oral daily  aspirin enteric coated 81milliGRAM(s) Oral daily  furosemide   Injectable 40milliGRAM(s) IV Push every 12 hours  pantoprazole    Tablet 40milliGRAM(s) Oral before breakfast  influenza   Vaccine 0.5milliLiter(s) IntraMuscular once    MEDICATIONS  (PRN):

## 2017-02-04 DIAGNOSIS — I34.0 NONRHEUMATIC MITRAL (VALVE) INSUFFICIENCY: ICD-10-CM

## 2017-02-04 LAB
ALBUMIN SERPL ELPH-MCNC: 3.5 G/DL — SIGNIFICANT CHANGE UP (ref 3.3–5.2)
ALP SERPL-CCNC: 132 U/L — HIGH (ref 40–120)
ALT FLD-CCNC: 206 U/L — HIGH
ANION GAP SERPL CALC-SCNC: 13 MMOL/L — SIGNIFICANT CHANGE UP (ref 5–17)
AST SERPL-CCNC: 49 U/L — HIGH
BILIRUB DIRECT SERPL-MCNC: 0.4 MG/DL — HIGH (ref 0–0.3)
BILIRUB INDIRECT FLD-MCNC: 0.6 MG/DL — SIGNIFICANT CHANGE UP (ref 0.2–1)
BILIRUB SERPL-MCNC: 1 MG/DL — SIGNIFICANT CHANGE UP (ref 0.4–2)
BLD GP AB SCN SERPL QL: SIGNIFICANT CHANGE UP
BUN SERPL-MCNC: 24 MG/DL — HIGH (ref 8–20)
CALCIUM SERPL-MCNC: 9.5 MG/DL — SIGNIFICANT CHANGE UP (ref 8.6–10.2)
CHLORIDE SERPL-SCNC: 100 MMOL/L — SIGNIFICANT CHANGE UP (ref 98–107)
CO2 SERPL-SCNC: 27 MMOL/L — SIGNIFICANT CHANGE UP (ref 22–29)
CREAT SERPL-MCNC: 0.88 MG/DL — SIGNIFICANT CHANGE UP (ref 0.5–1.3)
GLUCOSE SERPL-MCNC: 100 MG/DL — SIGNIFICANT CHANGE UP (ref 70–115)
HCT VFR BLD CALC: 44.2 % — SIGNIFICANT CHANGE UP (ref 42–52)
HGB BLD-MCNC: 14.5 G/DL — SIGNIFICANT CHANGE UP (ref 14–18)
MAGNESIUM SERPL-MCNC: 2.2 MG/DL — SIGNIFICANT CHANGE UP (ref 1.8–2.5)
MCHC RBC-ENTMCNC: 29.5 PG — SIGNIFICANT CHANGE UP (ref 27–31)
MCHC RBC-ENTMCNC: 32.8 G/DL — SIGNIFICANT CHANGE UP (ref 32–36)
MCV RBC AUTO: 89.8 FL — SIGNIFICANT CHANGE UP (ref 80–94)
NT-PROBNP SERPL-SCNC: 1801 PG/ML — HIGH (ref 0–300)
PHOSPHATE SERPL-MCNC: 5.2 MG/DL — HIGH (ref 2.4–4.7)
PLATELET # BLD AUTO: 111 K/UL — LOW (ref 150–400)
POTASSIUM SERPL-MCNC: 3.8 MMOL/L — SIGNIFICANT CHANGE UP (ref 3.5–5.3)
POTASSIUM SERPL-SCNC: 3.8 MMOL/L — SIGNIFICANT CHANGE UP (ref 3.5–5.3)
PREALB SERPL-MCNC: 20 MG/DL — SIGNIFICANT CHANGE UP (ref 18–38)
PROT SERPL-MCNC: 6.8 G/DL — SIGNIFICANT CHANGE UP (ref 6.6–8.7)
RBC # BLD: 4.92 M/UL — SIGNIFICANT CHANGE UP (ref 4.6–6.2)
RBC # FLD: 15.4 % — SIGNIFICANT CHANGE UP (ref 11–15.6)
SODIUM SERPL-SCNC: 140 MMOL/L — SIGNIFICANT CHANGE UP (ref 135–145)
T4 FREE SERPL-MCNC: 1.2 NG/DL — SIGNIFICANT CHANGE UP (ref 0.9–1.8)
TYPE + AB SCN PNL BLD: SIGNIFICANT CHANGE UP
WBC # BLD: 6.5 K/UL — SIGNIFICANT CHANGE UP (ref 4.8–10.8)
WBC # FLD AUTO: 6.5 K/UL — SIGNIFICANT CHANGE UP (ref 4.8–10.8)

## 2017-02-04 PROCEDURE — 99232 SBSQ HOSP IP/OBS MODERATE 35: CPT

## 2017-02-04 PROCEDURE — 99233 SBSQ HOSP IP/OBS HIGH 50: CPT

## 2017-02-04 RX ORDER — LISINOPRIL 2.5 MG/1
2.5 TABLET ORAL DAILY
Qty: 0 | Refills: 0 | Status: DISCONTINUED | OUTPATIENT
Start: 2017-02-04 | End: 2017-02-04

## 2017-02-04 RX ORDER — FUROSEMIDE 40 MG
20 TABLET ORAL DAILY
Qty: 0 | Refills: 0 | Status: DISCONTINUED | OUTPATIENT
Start: 2017-02-04 | End: 2017-02-06

## 2017-02-04 RX ORDER — POTASSIUM CHLORIDE 20 MEQ
40 PACKET (EA) ORAL EVERY 4 HOURS
Qty: 0 | Refills: 0 | Status: COMPLETED | OUTPATIENT
Start: 2017-02-04 | End: 2017-02-04

## 2017-02-04 RX ORDER — METOPROLOL TARTRATE 50 MG
12.5 TABLET ORAL
Qty: 0 | Refills: 0 | Status: DISCONTINUED | OUTPATIENT
Start: 2017-02-04 | End: 2017-02-06

## 2017-02-04 RX ADMIN — Medication 81 MILLIGRAM(S): at 11:33

## 2017-02-04 RX ADMIN — CHLORHEXIDINE GLUCONATE 15 MILLILITER(S): 213 SOLUTION TOPICAL at 18:19

## 2017-02-04 RX ADMIN — Medication 40 MILLIEQUIVALENT(S): at 09:07

## 2017-02-04 RX ADMIN — Medication 12.5 MILLIGRAM(S): at 09:07

## 2017-02-04 RX ADMIN — Medication 50 GRAM(S): at 00:31

## 2017-02-04 RX ADMIN — CHLORHEXIDINE GLUCONATE 1 APPLICATION(S): 213 SOLUTION TOPICAL at 17:28

## 2017-02-04 RX ADMIN — Medication 40 MILLIEQUIVALENT(S): at 11:33

## 2017-02-04 RX ADMIN — CHLORHEXIDINE GLUCONATE 15 MILLILITER(S): 213 SOLUTION TOPICAL at 06:17

## 2017-02-04 RX ADMIN — ENOXAPARIN SODIUM 40 MILLIGRAM(S): 100 INJECTION SUBCUTANEOUS at 22:19

## 2017-02-04 RX ADMIN — PANTOPRAZOLE SODIUM 40 MILLIGRAM(S): 20 TABLET, DELAYED RELEASE ORAL at 06:17

## 2017-02-04 RX ADMIN — Medication 40 MILLIGRAM(S): at 06:17

## 2017-02-04 RX ADMIN — Medication 12.5 MILLIGRAM(S): at 18:20

## 2017-02-04 RX ADMIN — CHLORHEXIDINE GLUCONATE 1 APPLICATION(S): 213 SOLUTION TOPICAL at 06:16

## 2017-02-04 NOTE — PROGRESS NOTE ADULT - SUBJECTIVE AND OBJECTIVE BOX
Language services  # [id number]  Subjective: "I have no pain,, I know my surgery is Monday"  Son at bedside    T(C): 36.5, Max: 36.8 (02-03 @ 08:15)  HR: 80 (78 - 87)  BP: 90/50 (90/50 - 112/64)  RR: 18 (12 - 18)  SpO2: 99% (97% - 99%)  Tele:  SR  90's  5 beat VT this am  LVEF: 20%      04 Feb 2017 05:30    140    |  100    |  24.0   ----------------------------<  100    3.8     |  27.0   |  0.88     Ca    9.5        04 Feb 2017 05:30  Phos  5.2       04 Feb 2017 05:30  Mg     2.2       04 Feb 2017 05:30    TPro  6.8    /  Alb  3.5    /  TBili  1.0    /  DBili  0.4    /  AST  49     /  ALT  206    /  AlkPhos  132    04 Feb 2017 05:30                               14.5   6.50  )-----------( 111      ( 04 Feb 2017 05:30 )             44.2        PT/INR - ( 03 Feb 2017 07:13 )   PT: 12.3 sec;   INR: 1.12 ratio         PTT - ( 02 Feb 2017 13:52 )  PTT:39.6 sec         CAPILLARY BLOOD GLUCOSE           CXR:none today    MEDICATIONS  (STANDING):  aspirin enteric coated 81milliGRAM(s) Oral daily  pantoprazole    Tablet 40milliGRAM(s) Oral before breakfast  enoxaparin Injectable 40milliGRAM(s) SubCutaneous daily  chlorhexidine 4% Liquid 1Application(s) Topical two times a day  chlorhexidine 0.12% Liquid 15milliLiter(s) Swish and Spit two times a day  furosemide   Injectable 40milliGRAM(s) IV Push daily  metoprolol 12.5milliGRAM(s) Oral two times a day  potassium chloride    Tablet ER 40milliEquivalent(s) Oral every 4 hours        Physical Exam:    Neuro: alert  w/o cognitive impairment    Pulm: essentially clear    CV: S1S2  RRR    IV/VI  GAIL across precordium  R/T  R carotid    Abd: flat soft  +  BS  no palpable bruit    Extremities: no edema, no calf pain        Assessment:     61 year old male with a  recently was admitted to [facility] on 1/21   for complains of acute onset  SOB , systolic heart failure was transferred to [facility]    cardiac cath 2/2 > no coronary disease, mild MR, moderate to severe aortic insufficiency.      PAST MEDICAL & SURGICAL HISTORY:  Mitral valve insufficiency, unspecified etiology  Aortic valve insufficiency, unspecified etiology  HFrEF (heart failure with reduced ejection fraction)  No significant past surgical history        Plan:  Continue betablocker > changed to lopressor for short acting benefot preop  D/C ACE > preop measure to protect against postop vasoplegia, will resume postop  DVT/GERD prophylaxis  Diuretic for reduced EF  Replete potassium/magnesium  OR 2/6 AVR/MVR  Discussed am rounds Dr Ruiz

## 2017-02-04 NOTE — PROGRESS NOTE ADULT - SUBJECTIVE AND OBJECTIVE BOX
Patient is a 61y old  Male who presents with a chief complaint of "I was having some shortness of breath." (2017 18:25)        PAST MEDICAL & SURGICAL HISTORY:  Mitral valve insufficiency, unspecified etiology  Aortic valve insufficiency, unspecified etiology  HFrEF (heart failure with reduced ejection fraction)  No significant past surgical history      Summary of admission HPI:      60 yo M w/ NICM, severe AI, MR planned for valve replacements on Monday.         PREVIOUS DIAGNOSTIC TESTING:      ECHO  FINDINGS:    STRESS  FINDINGS:    CATHETERIZATION  FINDINGS:    ELECTROPHYSIOLOGY STUDY  FINDINGS:    CAROTID ULTRASOUND:  FINDINGS    VENOUS DUPLEX SCAN:  FINDINGS:    CHEST CT PULMONARY ANGIO with IV Contrast:  FINDINGS:  MEDICATIONS  (STANDING):  aspirin enteric coated 81milliGRAM(s) Oral daily  pantoprazole    Tablet 40milliGRAM(s) Oral before breakfast  enoxaparin Injectable 40milliGRAM(s) SubCutaneous daily  chlorhexidine 4% Liquid 1Application(s) Topical two times a day  chlorhexidine 0.12% Liquid 15milliLiter(s) Swish and Spit two times a day  metoprolol 12.5milliGRAM(s) Oral two times a day  furosemide    Tablet 20milliGRAM(s) Oral daily    MEDICATIONS  (PRN):      FAMILY HISTORY:  No pertinent family history in first degree relatives      SOCIAL HISTORY:    CIGARETTES:    ALCOHOL:    REVIEW OF SYSTEMS:    CONSTITUTIONAL: No fever, weight loss, chills, shakes, or fatigue  EYES: No eye pain, visual disturbances, or discharge  ENMT:  No difficulty hearing, tinnitus, vertigo; No sinus or throat pain  NECK: No pain or stiffness  BREASTS: No pain, masses, or nipple discharge  RESPIRATORY: No cough, wheezing, hemoptysis, or shortness of breath  CARDIOVASCULAR: No chest pain, dyspnea, palpitations, dizziness, syncope, paroxysmal nocturnal dyspnea, orthopnea, or arm or leg swelling  GASTROINTESTINAL: No abdominal  or epigastric pain, nausea, vomiting, hematemesis, diarrhea, constipation, melena or bright red blood.  GENITOURINARY: No dysuria, nocturia, hematuria, or urinary incontinence  NEUROLOGICAL: No headaches, memory loss, slurred speech, limb weakness, loss of strength, numbness, or tremors  SKIN: No itching, burning, rashes, or lesions   LYMPH NODES: No enlarged glands  ENDOCRINE: No heat or cold intolerance, or hair loss  MUSCULOSKELETAL: No joint pain or swelling, muscle, back, or extremity pain  PSYCHIATRIC: No depression, anxiety, or difficulty sleeping  HEME/LYMPH: No easy bruising or bleeding gums  ALLERY AND IMMUNOLOGIC: No hives or rash.      Vital Signs Last 24 Hrs  T(C): 37.1, Max: 37.2 (- @ 09:07)  T(F): 98.8, Max: 98.9 ( @ 09:07)  HR: 89 (80 - 89)  BP: 98/54 (84/50 - 110/58)  BP(mean): --  RR: 18 (16 - 18)  SpO2: 97% (97% - 99%)    PHYSICAL EXAM:    GENERAL: In no apparent distress, well nourished, and hydrated.  HEAD:  Atraumatic, Normocephalic  EYES: EOMI, PERRLA, conjunctiva and sclera clear  ENMT: No tonsillar erythema, exudates, or enlargement; Moist mucous membranes, Good dentition, No lesions  NECK: Supple and normal thyroid.  No JVD or carotid bruit.  Carotid pulse is 2+ bilaterally.  HEART: Regular rate and rhythm; +3/6 systolic murmur   PULMONARY: decreased BS bibasilar  ABDOMEN: Soft, Nontender, Nondistended; Bowel sounds present  EXTREMITIES: trace edema BL- warm           INTERPRETATION OF TELEMETRY:    ECG:    I&O's Detail  I & Os for 24h ending 2017 07:00  =============================================  IN:    Oral Fluid: 600 ml    IV PiggyBack: 50 ml    Total IN: 650 ml  ---------------------------------------------  OUT:    Voided: 3475 ml    Total OUT: 3475 ml  ---------------------------------------------  Total NET: -2825 ml    I & Os for current day (as of 2017 19:57)  =============================================  IN:    Total IN: 0 ml  ---------------------------------------------  OUT:    Voided: 600 ml    Total OUT: 600 ml  ---------------------------------------------  Total NET: -600 ml      LABS:                        14.5   6.50  )-----------( 111      ( 2017 05:30 )             44.2     2017 05:30    140    |  100    |  24.0   ----------------------------<  100    3.8     |  27.0   |  0.88     Ca    9.5        2017 05:30  Phos  5.2       2017 05:30  Mg     2.2       2017 05:30    TPro  6.8    /  Alb  3.5    /  TBili  1.0    /  DBili  0.4    /  AST  49     /  ALT  206    /  AlkPhos  132    2017 05:30        PT/INR - ( 2017 07:13 )   PT: 12.3 sec;   INR: 1.12 ratio           Urinalysis Basic - ( 2017 02:14 )    Color: Yellow / Appearance: Clear / S.010 / pH: x  Gluc: x / Ketone: Negative  / Bili: Negative / Urobili: 1 mg/dL   Blood: x / Protein: Negative mg/dL / Nitrite: Negative   Leuk Esterase: Negative / RBC: 0-2 /HPF / WBC 0-2   Sq Epi: x / Non Sq Epi: Negative / Bacteria: Negative      BNPSerum Pro-Brain Natriuretic Peptide: 1801 pg/mL ( @ 05:30)    I&O's Detail  I & Os for 24h ending 2017 07:00  =============================================  IN:    Oral Fluid: 600 ml    IV PiggyBack: 50 ml    Total IN: 650 ml  ---------------------------------------------  OUT:    Voided: 3475 ml    Total OUT: 3475 ml  ---------------------------------------------  Total NET: -2825 ml    I & Os for current day (as of 2017 19:57)  =============================================  IN:    Total IN: 0 ml  ---------------------------------------------  OUT:    Voided: 600 ml    Total OUT: 600 ml  ---------------------------------------------  Total NET: -600 ml    Daily     Daily Weight in k.5 (2017 09:28)    RADIOLOGY & ADDITIONAL STUDIES:

## 2017-02-04 NOTE — PROGRESS NOTE ADULT - SUBJECTIVE AND OBJECTIVE BOX
CC: Shortness of breath. JARVIS EF 15-20%.  Aortic valve incompetence. Mitral regurgitation, dilated ascending aorta. Concern for low platelet 109. Exercise intolerance. Dr Carter cardiosurgery planning MVR,AVR.   HPI:   is a 60y/o male former smoker with no significant past medical history who went to [facility] for rapidly progressing SOB and occasional chest pain, (CCS class 3 anginal equivalent, NYHA class 3 heart failure). He states BLE edema, > 3 pillow orthopnea, palpitations, and claudication.  He had SOB and chest pain while in [place] and went to a doctor who gave him a medication that he does not recall the name of.  The illness progressed and [place] the [place] went to [facility]. His echo demonstrated a global hypokinesis and it showed an EF of 15-20%. He had a cath which demonstrated normal coronaries and he is being admitted for acute systolic heart failure.    On my exam, he's Cymro speaking and appears very comfortable, lying flat in bed without oxygen, and no tachypnea present. The etiology of his cardiomyopathy is unknown. (2017 18:25)    REVIEW OF SYSTEMS:    Patient denied fever, chills, abdominal pain, nausea, vomiting, cough, shortness of breath, chest pain or palpitations    Vital Signs Last 24 Hrs  T(C): 37.2, Max: 37.2 ( @ 09:07)  T(F): 98.9, Max: 98.9 ( @ 09:07)  HR: 84 (78 - 87)  BP: 100/52 (90/50 - 110/60)  BP(mean): --  RR: 17 (12 - 18)  SpO2: 98% (97% - 99%)I&O's Summary    I & Os for current day (as of 2017 12:54)  =============================================  IN: 650 ml / OUT: 3475 ml / NET: -2825 ml    PHYSICAL EXAM:  GENERAL: NAD, well-groomed  HEENT: PERRL, +EOMI, anicteric,  NECK: Supple, No JVD   CHEST/LUNG: CTA bilaterally; Normal effort  HEART: S1S2 Normal intensity, no murmurs, gallops or rubs noted  ABDOMEN: Soft, BS Normoactive, NT, ND, no HSM noted  EXTREMITIES:  2+ radial and DP pulses noted, no clubbing, cyanosis, or edema noted, FROM x 4  SKIN: No rashes or lesions noted  NEURO: A&Ox3, no focal deficits noted, CN II-XII intact  PSYCH: normal mood and affect; insight/judgement appropriate  LABS:                        14.5   6.50  )-----------( 111      ( 2017 05:30 )             44.2     2017 05:30    140    |  100    |  24.0   ----------------------------<  100    3.8     |  27.0   |  0.88     Ca    9.5        2017 05:30  Phos  5.2       2017 05:30  Mg     2.2       2017 05:30    TPro  6.8    /  Alb  3.5    /  TBili  1.0    /  DBili  0.4    /  AST  49     /  ALT  206    /  AlkPhos  132    2017 05:30    PT/INR - ( 2017 07:13 )   PT: 12.3 sec;   INR: 1.12 ratio         PTT - ( 2017 13:52 )  PTT:39.6 sec  Urinalysis Basic - ( 2017 02:14 )    Color: Yellow / Appearance: Clear / S.010 / pH: x  Gluc: x / Ketone: Negative  / Bili: Negative / Urobili: 1 mg/dL   Blood: x / Protein: Negative mg/dL / Nitrite: Negative   Leuk Esterase: Negative / RBC: 0-2 /HPF / WBC 0-2   Sq Epi: x / Non Sq Epi: Negative / Bacteria: Negative      RADIOLOGY & ADDITIONAL TESTS:    MEDICATIONS:  MEDICATIONS  (STANDING):  aspirin enteric coated 81milliGRAM(s) Oral daily  pantoprazole    Tablet 40milliGRAM(s) Oral before breakfast  enoxaparin Injectable 40milliGRAM(s) SubCutaneous daily  chlorhexidine 4% Liquid 1Application(s) Topical two times a day  chlorhexidine 0.12% Liquid 15milliLiter(s) Swish and Spit two times a day  furosemide   Injectable 40milliGRAM(s) IV Push daily  metoprolol 12.5milliGRAM(s) Oral two times a day    MEDICATIONS  (PRN):

## 2017-02-05 ENCOUNTER — RESULT REVIEW (OUTPATIENT)
Age: 62
End: 2017-02-05

## 2017-02-05 DIAGNOSIS — I50.23 ACUTE ON CHRONIC SYSTOLIC (CONGESTIVE) HEART FAILURE: ICD-10-CM

## 2017-02-05 LAB
ALBUMIN SERPL ELPH-MCNC: 3.8 G/DL — SIGNIFICANT CHANGE UP (ref 3.3–5.2)
ALP SERPL-CCNC: 130 U/L — HIGH (ref 40–120)
ALT FLD-CCNC: 164 U/L — HIGH
ANION GAP SERPL CALC-SCNC: 14 MMOL/L — SIGNIFICANT CHANGE UP (ref 5–17)
APTT BLD: 44.8 SEC — HIGH (ref 27.5–37.4)
AST SERPL-CCNC: 40 U/L — HIGH
BILIRUB SERPL-MCNC: 0.8 MG/DL — SIGNIFICANT CHANGE UP (ref 0.4–2)
BLD GP AB SCN SERPL QL: SIGNIFICANT CHANGE UP
BUN SERPL-MCNC: 30 MG/DL — HIGH (ref 8–20)
CALCIUM SERPL-MCNC: 9.6 MG/DL — SIGNIFICANT CHANGE UP (ref 8.6–10.2)
CHLORIDE SERPL-SCNC: 97 MMOL/L — LOW (ref 98–107)
CO2 SERPL-SCNC: 27 MMOL/L — SIGNIFICANT CHANGE UP (ref 22–29)
CREAT SERPL-MCNC: 0.99 MG/DL — SIGNIFICANT CHANGE UP (ref 0.5–1.3)
FIBRINOGEN PPP-MCNC: 570 MG/DL — HIGH (ref 255–510)
GLUCOSE SERPL-MCNC: 98 MG/DL — SIGNIFICANT CHANGE UP (ref 70–115)
HCT VFR BLD CALC: 44.5 % — SIGNIFICANT CHANGE UP (ref 42–52)
HGB BLD-MCNC: 14.2 G/DL — SIGNIFICANT CHANGE UP (ref 14–18)
INR BLD: 1.08 RATIO — SIGNIFICANT CHANGE UP (ref 0.88–1.16)
MAGNESIUM SERPL-MCNC: 2 MG/DL — SIGNIFICANT CHANGE UP (ref 1.8–2.5)
MCHC RBC-ENTMCNC: 29.2 PG — SIGNIFICANT CHANGE UP (ref 27–31)
MCHC RBC-ENTMCNC: 31.9 G/DL — LOW (ref 32–36)
MCV RBC AUTO: 91.4 FL — SIGNIFICANT CHANGE UP (ref 80–94)
PLATELET # BLD AUTO: 134 K/UL — LOW (ref 150–400)
POTASSIUM SERPL-MCNC: 3.8 MMOL/L — SIGNIFICANT CHANGE UP (ref 3.5–5.3)
POTASSIUM SERPL-SCNC: 3.8 MMOL/L — SIGNIFICANT CHANGE UP (ref 3.5–5.3)
PROT SERPL-MCNC: 7 G/DL — SIGNIFICANT CHANGE UP (ref 6.6–8.7)
PROTHROM AB SERPL-ACNC: 11.9 SEC — SIGNIFICANT CHANGE UP (ref 10–13.1)
RBC # BLD: 4.87 M/UL — SIGNIFICANT CHANGE UP (ref 4.6–6.2)
RBC # FLD: 15.5 % — SIGNIFICANT CHANGE UP (ref 11–15.6)
SODIUM SERPL-SCNC: 138 MMOL/L — SIGNIFICANT CHANGE UP (ref 135–145)
TYPE + AB SCN PNL BLD: SIGNIFICANT CHANGE UP
WBC # BLD: 8.71 K/UL — SIGNIFICANT CHANGE UP (ref 4.8–10.8)
WBC # FLD AUTO: 8.71 K/UL — SIGNIFICANT CHANGE UP (ref 4.8–10.8)

## 2017-02-05 PROCEDURE — 99232 SBSQ HOSP IP/OBS MODERATE 35: CPT | Mod: 57

## 2017-02-05 PROCEDURE — 99233 SBSQ HOSP IP/OBS HIGH 50: CPT

## 2017-02-05 RX ORDER — CEFUROXIME AXETIL 250 MG
1500 TABLET ORAL ONCE
Qty: 0 | Refills: 0 | Status: DISCONTINUED | OUTPATIENT
Start: 2017-02-05 | End: 2017-02-06

## 2017-02-05 RX ORDER — POTASSIUM CHLORIDE 20 MEQ
40 PACKET (EA) ORAL EVERY 4 HOURS
Qty: 0 | Refills: 0 | Status: COMPLETED | OUTPATIENT
Start: 2017-02-05 | End: 2017-02-05

## 2017-02-05 RX ADMIN — Medication 81 MILLIGRAM(S): at 11:16

## 2017-02-05 RX ADMIN — Medication 40 MILLIEQUIVALENT(S): at 09:19

## 2017-02-05 RX ADMIN — Medication 40 MILLIEQUIVALENT(S): at 11:16

## 2017-02-05 RX ADMIN — Medication 12.5 MILLIGRAM(S): at 18:14

## 2017-02-05 RX ADMIN — Medication 20 MILLIGRAM(S): at 05:09

## 2017-02-05 RX ADMIN — CHLORHEXIDINE GLUCONATE 15 MILLILITER(S): 213 SOLUTION TOPICAL at 05:09

## 2017-02-05 RX ADMIN — CHLORHEXIDINE GLUCONATE 1 APPLICATION(S): 213 SOLUTION TOPICAL at 09:19

## 2017-02-05 RX ADMIN — PANTOPRAZOLE SODIUM 40 MILLIGRAM(S): 20 TABLET, DELAYED RELEASE ORAL at 05:09

## 2017-02-05 RX ADMIN — Medication 12.5 MILLIGRAM(S): at 05:09

## 2017-02-05 RX ADMIN — CHLORHEXIDINE GLUCONATE 15 MILLILITER(S): 213 SOLUTION TOPICAL at 18:33

## 2017-02-05 RX ADMIN — CHLORHEXIDINE GLUCONATE 1 APPLICATION(S): 213 SOLUTION TOPICAL at 21:32

## 2017-02-05 NOTE — PROGRESS NOTE ADULT - SUBJECTIVE AND OBJECTIVE BOX
Cardiac Surgery Pre-op Note:  60 y/o male scheduled for MVR / AVR for severe AI, severe MR  EF  20%                                                                                                                Surgeon: Raul    Procedure: MVR / TVR    Allergies    No Known Allergies    Intolerances        HPI:   is a 62y/o male former smoker with no significant past medical history who went to [facility] for rapidly progressing SOB and occasional chest pain, (CCS class 3 anginal equivalent, NYHA class 3 heart failure). He states BLE edema, > 3 pillow orthopnea, palpitations, and claudication.  He had SOB and chest pain while in [place] and went to a doctor who gave him a medication that he does not recall the name of.  The illness progressed and [place] the [place] he went to [facility]. His echo demonstrated a global hypokinesis and it showed an EF of 15-20%. He had a cath which demonstrated normal coronaries and he is being admitted for acute systolic heart failure.     Eritrean speaking and appears very comfortable, lying flat in bed without oxygen, and no tachypnea present. The etiology of his cardiomyopathy is unknown. (02 Feb 2017 18:25)      PAST MEDICAL & SURGICAL HISTORY:  Mitral valve insufficiency, unspecified etiology  Aortic valve insufficiency, unspecified etiology  HFrEF (heart failure with reduced ejection fraction)  No significant past surgical history      MEDICATIONS  (STANDING):  aspirin enteric coated 81milliGRAM(s) Oral daily  pantoprazole    Tablet 40milliGRAM(s) Oral before breakfast  enoxaparin Injectable 40milliGRAM(s) SubCutaneous daily  chlorhexidine 4% Liquid 1Application(s) Topical two times a day  chlorhexidine 0.12% Liquid 15milliLiter(s) Swish and Spit two times a day  metoprolol 12.5milliGRAM(s) Oral two times a day  furosemide    Tablet 20milliGRAM(s) Oral daily  cefuroxime  IVPB 1500milliGRAM(s) IV Intermittent once    Labs:                        14.2   8.71  )-----------( 134      ( 05 Feb 2017 05:26 )             44.5     05 Feb 2017 05:26    138    |  97     |  30.0   ----------------------------<  98     3.8     |  27.0   |  0.99     Ca    9.6        05 Feb 2017 05:26  Phos  5.2       04 Feb 2017 05:30  Mg     2.0       05 Feb 2017 05:26    TPro  7.0    /  Alb  3.8    /  TBili  0.8    /  DBili  x      /  AST  40     /  ALT  164    /  AlkPhos  130    05 Feb 2017 05:26        Hemoglobin A1C, Whole Blood: 5.2 % (02-03 @ 07:13)        AST Aspartate Aminotransferase (AST/SGOT): 49 U/L (02.04.17 @ 05:30)   ALT  Aspartate Aminotransferase (AST/SGOT): 72 U/L (02.03.17 @ 07:13)   Prealbumin (02.04.17 @ 05:30)    20 mg/dL        ProBNP:Serum Pro-Brain Natriuretic Peptide (02.04.17 @ 05:30)    Serum Pro-Brain Natriuretic Peptide: 1801    TSH:Thyroid Stimulating Hormone, Serum (02.03.17 @ 07:13)    Thyroid Stimulating Hormone, Serum: 3.14 uIU/mL      MRSA/MSSA:MRSA/MSSA PCR (02.03.17 @ 13:51)    MRSA PCR Result.: NotDetec: NOT DETECTED RESULT: MRSA and MSSA not detected    MSSA PCR Result.: NotDetec      U/A:Urinalysis (02.03.17 @ 02:14)    Blood, Urine: Trace    pH Urine: 5.0    Glucose Qualitative, Urine: Negative mg/dL    Color: Yellow    Urine Appearance: Clear    Bilirubin: Negative    Ketone - Urine: Negative    Specific Gravity: 1.010    Protein, Urine: Negative mg/dL    Urobilinogen: 1 mg/dL    Nitrite: Negative    Leukocyte Esterase Concentration: Negative                CXR: No evidence of active chest disease.   Cardiomegaly.      EKG:    Carotid Duplex:Mild plaque without a hemodynamic abnormality.    PFT's:    Echo:.2/3  Ascending aorta is 4.7 cm in diameter (measured at 4 cm from   aortic valve annulus). Severely decreased global left ventricular systolic function.   3. Left ventricular ejection fraction, by visual estimation, is <20%.   4. Elevated left atrial and left ventricular end-diastolic pressures.   5. Spectral Doppler shows restrictive pattern of left ventricular   myocardial filling (Grade III diastolic dysfunction).   6. No left atrial appendage thrombus.   7. Color flow doppler and intravenous injection of agitated saline   demonstrates the presence of an intact intra atrial septum.   8. Mildly dilated right atrium.   9. Moderately enlarged left atrium.  10. The anterior leaflet of mitral valve is redundant. There is severe   mitral valve regurgitation, somewhat related to aortic valve   regurgitation that hits the anterior leaflet ([place] mechanism).  11. Bicuspid aortic valve with doming of the right coronary cusp.   Moderate to severe AI is present.  12. Partial sinotubular junction effacement. Carolyn aortic root at the   sinuses of Valsalva. Ascending aorta is 4.7 cm in diameter (measured at 4   cm from aortic valve annulus).  13. Moderate tricuspid regurgitation.  14. Estimated pulmonary artery systolic pressure is 43.2 mmHg assuming a   right atrial pressure of 5 mmHg, which is consistent with mild pulmonary   hypertension.  15. Trivial pericardial effusion.    Cath report:  2/2/17    VENTRICLES: Global left ventricular function was severely depressed. EF  estimated was 20 %.  VALVES: AORTIC VALVE: There was moderate to severe aortic insufficiency.  MITRAL VALVE: The mitral valve exhibited mild regurgitation.  CORONARY VESSELS: The coronary circulation is right dominant.  LM:   --  LM: Angiography showed minor luminal irregularities with no flow  limiting lesions.  LAD:   --  Proximal LAD: Angiography showed minor luminal irregularities  with no flow limiting lesions.  CX:   --  Proximal circumflex: Angiography showed mild atherosclerosis with  no flow limiting lesions.  RCA:   --  Mid RCA: There was a tubular 30 % stenosis. The lesion was  moderately calcified.  AORTA: Ascending aorta: The segment was moderately dilated.    Physical Exam Exqamined/Interviewed [facility]  #[id number]    Neuro: no obvious impairment  CV: S1 S2  RRR  Resp: essentially clear  Abd: softly distended  + bowel sounds  Ext: no calf pain,  no edema          Pt has AICD/PPm [ ] Yes  [x ] No             Brand Name:  Pre-op Beta Blocker ordered within 24 hrs of surgery?  [x ] Yes  [ ] No  If not, Why?  Type & Cross  [x ] Yes  [ ] No  NPO after Midnight x ] Yes  [ ] No  Pre-op ABX ordered, to be taped on chart:  [x ] Yes  [ ] No   ( Vanco only if Anaphylaxis, or MRSA)  Zinacef  Consent obtained  [ ] Yes  [ ] No    Plan:    Continue betablocker HR control  Preop workup completed  GERD prophylaxis  MVR/AVR sophie dunn/ Dr Carter

## 2017-02-05 NOTE — PROGRESS NOTE ADULT - SUBJECTIVE AND OBJECTIVE BOX
CC: Severe valvular disease AI, MR, with shortness of breath and exercise intolerance.  Planned for valve replacement surgery tomorrow.   HPI:   is a 62y/o male former smoker with no significant past medical history who went to [facility] for rapidly progressing SOB and occasional chest pain, (CCS class 3 anginal equivalent, NYHA class 3 heart failure). He states BLE edema, > 3 pillow orthopnea, palpitations, and claudication.  He had SOB and chest pain while in [place] and went to a doctor who gave him a medication that he does not recall the name of.  The illness progressed and [place] the [place] he went to [facility]. His echo demonstrated a global hypokinesis and it showed an EF of 15-20%. He had a cath which demonstrated normal coronaries and he is being admitted for acute systolic heart failure.    On my exam, he's Bahraini speaking and appears very comfortable, lying flat in bed without oxygen, and no tachypnea present. The etiology of his cardiomyopathy is unknown. (02 Feb 2017 18:25)    REVIEW OF SYSTEMS:    Patient denied fever, chills, abdominal pain, nausea, vomiting, cough, chest pain or palpitations    Vital Signs Last 24 Hrs  T(C): 36.7, Max: 37.1 (02-04 @ 17:17)  T(F): 98, Max: 98.8 (02-04 @ 17:17)  HR: 84 (80 - 89)  BP: 92/60 (84/50 - 100/58)  BP(mean): --  RR: 15 (15 - 18)  SpO2: 98% (93% - 98%)I&O's Summary  I & Os for 24h ending 05 Feb 2017 07:00  =============================================  IN: 0 ml / OUT: 600 ml / NET: -600 ml    I & Os for current day (as of 05 Feb 2017 12:39)  =============================================  IN: 240 ml / OUT: 300 ml / NET: -60 ml    PHYSICAL EXAM:  GENERAL: NAD, well-groomed  HEENT: PERRL, +EOMI, anicteric,  NECK: Supple, No JVD   CHEST/LUNG: bilaterally rales   HEART: S1S2 Normal intensity, no murmurs, gallops or rubs noted  ABDOMEN: Soft, BS Normoactive, NT, ND, no HSM noted  EXTREMITIES:  2+ radial and DP pulses noted, no clubbing, cyanosis, or edema noted, FROM x 4  SKIN: No rashes or lesions noted  NEURO: A&Ox3, no focal deficits noted, CN II-XII intact  PSYCH: normal mood and affect; insight/judgement appropriate  LABS:                        14.2   8.71  )-----------( 134      ( 05 Feb 2017 05:26 )             44.5     05 Feb 2017 05:26    138    |  97     |  30.0   ----------------------------<  98     3.8     |  27.0   |  0.99     Ca    9.6        05 Feb 2017 05:26  Phos  5.2       04 Feb 2017 05:30  Mg     2.0       05 Feb 2017 05:26    TPro  7.0    /  Alb  3.8    /  TBili  0.8    /  DBili  x      /  AST  40     /  ALT  164    /  AlkPhos  130    05 Feb 2017 05:26        RADIOLOGY & ADDITIONAL TESTS:    MEDICATIONS:  MEDICATIONS  (STANDING):  pantoprazole    Tablet 40milliGRAM(s) Oral before breakfast  chlorhexidine 4% Liquid 1Application(s) Topical two times a day  chlorhexidine 0.12% Liquid 15milliLiter(s) Swish and Spit two times a day  metoprolol 12.5milliGRAM(s) Oral two times a day  furosemide    Tablet 20milliGRAM(s) Oral daily  cefuroxime  IVPB 1500milliGRAM(s) IV Intermittent once    MEDICATIONS  (PRN):

## 2017-02-05 NOTE — CONSULT NOTE ADULT - ASSESSMENT
Mr. Zapata is seen on consultation for fluctuating thrombocytopenia.  His platelet count is spontaneously improving and remains over 100,000.  He has elevated LFT's, (?) drinker.  Etiology of decreased platelets may be related to possible hepatic pathology.   Suggest Retic count, fibrinogen, imaging of liver.  Will follow platelet count.  should be able to hematologically clear patient as long as platelet count does not fall.

## 2017-02-05 NOTE — CONSULT NOTE ADULT - SUBJECTIVE AND OBJECTIVE BOX
Patient is a 61y old  Male who presents with a chief complaint of SOB, under cardiology care, with planned cardiac surgery this week.  Hematology consult asked to address fluctuating thrombocytopenia, unassociated with bleeding.  Patient appears comfortable and indicates no current complaints.    HPI:  60y/o male former smoker with no significant past medical history who went to [facility] for rapidly progressing SOB and occasional chest pain, (CCS class 3 anginal equivalent, NYHA class 3 heart failure).  He states BLE edema, > 3 pillow orthopnea, palpitations, and claudication.  He had SOB and chest pain while in [place] and went to a doctor who gave him a medication that he does not recall the name of.  The illness progressed and [place] the [place] he went to [facility].  Echo showed an EF of 15-20%.    Echo: 2017       LVSF: Severely decreased with global hypokinesis and grade 1 LVDD       EF: 15-20%       RVSF: Normal       LA: Normal       RA: Normal       Mitral Valve: Mild to moderate eccentric regurgitation       Aortic Valve: Mild to moderate AR       Tricuspid Valve: Mild TR       Pulmonic Valve: Normal       Pericardium: No significant pericardial effusion (2017 13:07)      PAST MEDICAL & SURGICAL HISTORY:  Mitral valve insufficiency, unspecified etiology  Aortic valve insufficiency, unspecified etiology  HFrEF (heart failure with reduced ejection fraction)  No significant past surgical history      PREVIOUS DIAGNOSTIC TESTING:      ECHO  FINDINGS:  See above    Allergies    No Known Allergies    Intolerances        MEDICATIONS  (STANDING):  carvedilol 3.125milliGRAM(s) Oral every 12 hours  lisinopril 2.5milliGRAM(s) Oral daily  aspirin enteric coated 81milliGRAM(s) Oral daily  furosemide   Injectable 40milliGRAM(s) IV Push every 12 hours    MEDICATIONS  (PRN):    carvedilol 3.125milliGRAM(s) Oral every 12 hours  lisinopril 2.5milliGRAM(s) Oral daily  aspirin enteric coated 81milliGRAM(s) Oral daily  furosemide   Injectable 40milliGRAM(s) IV Push every 12 hours      FAMILY HISTORY:  No pertinent family history in first degree relatives      SOCIAL HISTORY:    CIGARETTES: Former    ALCOHOL: Moderate    REVIEW OF SYSTEMS:  CONSTITUTIONAL: No fever, weight loss, or fatigue  EYES: No eye pain, visual disturbances, or discharge  ENMT:  No difficulty hearing, tinnitus, vertigo; No sinus or throat pain  NECK: No pain or stiffness  RESPIRATORY: No cough, wheezing, chills or hemoptysis; No Shortness of Breath  CARDIOVASCULAR: No chest pain, palpitations, passing out, dizziness, or leg swelling  GASTROINTESTINAL: No abdominal or epigastric pain. No nausea, vomiting, or hematemesis; No diarrhea or constipation. No melena or hematochezia.  GENITOURINARY: No dysuria, frequency, hematuria, or incontinence  NEUROLOGICAL: No headaches, memory loss, loss of strength, numbness, or tremors  SKIN: No itching, burning, rashes, or lesions   LYMPH Nodes: No enlarged glands  ENDOCRINE: No heat or cold intolerance; No hair loss  MUSCULOSKELETAL: No joint pain or swelling; No muscle, back, or extremity pain  PSYCHIATRIC: No depression, anxiety, mood swings, or difficulty sleeping  HEME/LYMPH: No easy bruising, or bleeding gums  ALLERY AND IMMUNOLOGIC: No hives or eczema	    Vital Signs Last 24 Hrs  T(C): 36.4, Max: 36.4 ( @ 13:35)  T(F): 97.6, Max: 97.6 ( @ 13:35)  HR: 89 (89 - 95)  BP: 115/67 (114/80 - 127/62)  BP(mean): 91 (91 - 91)  RR: 18 (18 - 26)  SpO2: 98% (96% - 98%)    Daily Height in cm: 167.64 (2017 13:35)    Daily Weight in k.6 (2017 13:07)    I&O's Detail      PHYSICAL EXAM:  Appearance: Normal, well nourished	  HEENT:   Normal oral mucosa, PERRL, EOMI, sclera non-icteric	  Lymphatic: No cervical lymphadenopathy  Cardiovascular: Normal S1 S2, No JVD, No cardiac murmurs, No carotid bruits, No peripheral edema  Respiratory: Lungs clear to auscultation	  Psychiatry: A & O x 3, Mood & affect appropriate  Gastrointestinal:  Soft, Non-tender, + BS, no bruits	  Skin: No rashes, No ecchymoses, No cyanosis  Neurologic: Grossly non-focal with full strength in all four extremities  Extremities: Normal range of motion, No clubbing, cyanosis or edema  Vascular: Peripheral pulses palpable 2+ bilaterally      INTERPRETATION OF TELEMETRY:    ECG: NSR.  LVH.  NSST changes    LABS:                        13.4   5.15  )-----------( 98       ( 2017 13:52 )             41.2     2017 13:52    140    |  106    |  15.0   ----------------------------<  90     4.4     |  22.0   |  0.88     Ca    9.5        2017 13:52  Phos  5.0       2017 13:52  Mg     1.9       2017 13:52    TPro  6.5    /  Alb  3.6    /  TBili  1.3    /  DBili  x      /  AST  101    /  ALT  338    /  AlkPhos  130    2017 13:52        PT/INR - ( 2017 13:52 )   PT: 12.8 sec;   INR: 1.16 ratio         PTT - ( 2017 13:52 )  PTT:39.6 sec    I&O's Summary    BNPSerum Pro-Brain Natriuretic Peptide: 3089 pg/mL ( @ 13:52)    RADIOLOGY & ADDITIONAL STUDIES:    Assessment:  HPI:  60y/o male former smoker with no significant past medical history who went to [facility] for rapidly progressing SOB and occasional chest pain, (CCS class 3 anginal equivalent, NYHA class 3 heart failure).  He states BLE edema, > 3 pillow orthopnea, palpitations, and claudication.  He had SOB and chest pain while in [place] and went to a doctor who gave him a medication that he does not recall the name of.  The illness progressed and [place] the [place] he went to [facility].  Echo showed an EF of 15-20%.    Echo: 2017       LVSF: Severely decreased with global hypokinesis and grade 1 LVDD       EF: 15-20%       RVSF: Normal       LA: Normal       RA: Normal       Mitral Valve: Mild to moderate eccentric regurgitation       Aortic Valve: Mild to moderate AR       Tricuspid Valve: Mild TR       Pulmonic Valve: Normal       Pericardium: No significant pericardial effusion (2017 13:07)

## 2017-02-05 NOTE — PROGRESS NOTE ADULT - SUBJECTIVE AND OBJECTIVE BOX
Patient is a 61y old  Male who presents with a chief complaint of "I was having some shortness of breath." (2017 18:25)        PAST MEDICAL & SURGICAL HISTORY:  Mitral valve insufficiency, unspecified etiology  Aortic valve insufficiency, unspecified etiology  HFrEF (heart failure with reduced ejection fraction)  No significant past surgical history      Summary of admission HPI:      62 yo M w/ NICM, severe AI, MR planned for valve replacements on Monday.           PREVIOUS DIAGNOSTIC TESTING:      ECHO  FINDINGS:    STRESS  FINDINGS:    CATHETERIZATION  FINDINGS:    ELECTROPHYSIOLOGY STUDY  FINDINGS:    CAROTID ULTRASOUND:  FINDINGS    VENOUS DUPLEX SCAN:  FINDINGS:    CHEST CT PULMONARY ANGIO with IV Contrast:  FINDINGS:  MEDICATIONS  (STANDING):  pantoprazole    Tablet 40milliGRAM(s) Oral before breakfast  chlorhexidine 4% Liquid 1Application(s) Topical two times a day  chlorhexidine 0.12% Liquid 15milliLiter(s) Swish and Spit two times a day  metoprolol 12.5milliGRAM(s) Oral two times a day  furosemide    Tablet 20milliGRAM(s) Oral daily  cefuroxime  IVPB 1500milliGRAM(s) IV Intermittent once    MEDICATIONS  (PRN):      Vital Signs Last 24 Hrs  T(C): 36.5, Max: 37.1 ( @ 17:17)  T(F): 97.7, Max: 98.8 ( @ 17:17)  HR: 84 (80 - 89)  BP: 90/56 (90/56 - 100/58)  BP(mean): --  RR: 14 (14 - 18)  SpO2: 98% (93% - 98%)    PHYSICAL EXAM:    HEAD:  Awake  HEART: Regular rate and rhythm; +3/6 systolic murmur   PULMONARY: decreased BS bibasilar  ABDOMEN: Soft, Nontender, Nondistended; Bowel sounds present  EXTREMITIES: trace edema BL- warm         I&O's Detail  I & Os for 24h ending 2017 07:00  =============================================  IN:    Total IN: 0 ml  ---------------------------------------------  OUT:    Voided: 600 ml    Total OUT: 600 ml  ---------------------------------------------  Total NET: -600 ml    I & Os for current day (as of 2017 17:10)  =============================================  IN:    Oral Fluid: 480 ml    Total IN: 480 ml  ---------------------------------------------  OUT:    Voided: 1050 ml    Total OUT: 1050 ml  ---------------------------------------------  Total NET: -570 ml      LABS:                        14.2   8.71  )-----------( 134      ( 2017 05:26 )             44.5     2017 05:26    138    |  97     |  30.0   ----------------------------<  98     3.8     |  27.0   |  0.99     Ca    9.6        2017 05:26  Phos  5.2       2017 05:30  Mg     2.0       2017 05:26    TPro  7.0    /  Alb  3.8    /  TBili  0.8    /  DBili  x      /  AST  40     /  ALT  164    /  AlkPhos  130    2017 05:26            BNP  I&O's Detail  I & Os for 24h ending 2017 07:00  =============================================  IN:    Total IN: 0 ml  ---------------------------------------------  OUT:    Voided: 600 ml    Total OUT: 600 ml  ---------------------------------------------  Total NET: -600 ml    I & Os for current day (as of 2017 17:10)  =============================================  IN:    Oral Fluid: 480 ml    Total IN: 480 ml  ---------------------------------------------  OUT:    Voided: 1050 ml    Total OUT: 1050 ml  ---------------------------------------------  Total NET: -570 ml    Daily     Daily Weight in k.9 (2017 05:18)    RADIOLOGY & ADDITIONAL STUDIES:

## 2017-02-06 ENCOUNTER — APPOINTMENT (OUTPATIENT)
Dept: CARDIOTHORACIC SURGERY | Facility: HOSPITAL | Age: 62
End: 2017-02-06
Payer: MEDICAID

## 2017-02-06 LAB
ALBUMIN SERPL ELPH-MCNC: 3.4 G/DL — SIGNIFICANT CHANGE UP (ref 3.3–5.2)
ALP SERPL-CCNC: 90 U/L — SIGNIFICANT CHANGE UP (ref 40–120)
ALT FLD-CCNC: 78 U/L — HIGH
ANION GAP SERPL CALC-SCNC: 13 MMOL/L — SIGNIFICANT CHANGE UP (ref 5–17)
ANION GAP SERPL CALC-SCNC: 14 MMOL/L — SIGNIFICANT CHANGE UP (ref 5–17)
ANION GAP SERPL CALC-SCNC: 15 MMOL/L — SIGNIFICANT CHANGE UP (ref 5–17)
APTT BLD: 42 SEC — HIGH (ref 27.5–37.4)
AST SERPL-CCNC: 83 U/L — HIGH
BASE EXCESS BLDV CALC-SCNC: -1.6 MMOL/L — SIGNIFICANT CHANGE UP (ref -3–3)
BASE EXCESS BLDV CALC-SCNC: 0.5 MMOL/L — SIGNIFICANT CHANGE UP (ref -3–3)
BILIRUB SERPL-MCNC: 1.9 MG/DL — SIGNIFICANT CHANGE UP (ref 0.4–2)
BLOOD GAS COMMENTS, VENOUS: SIGNIFICANT CHANGE UP
BUN SERPL-MCNC: 24 MG/DL — HIGH (ref 8–20)
BUN SERPL-MCNC: 24 MG/DL — HIGH (ref 8–20)
BUN SERPL-MCNC: 30 MG/DL — HIGH (ref 8–20)
CA-I SERPL-SCNC: 1.12 MMOL/L — LOW (ref 1.15–1.29)
CA-I SERPL-SCNC: 1.2 MMOL/L — SIGNIFICANT CHANGE UP (ref 1.15–1.29)
CALCIUM SERPL-MCNC: 7.6 MG/DL — LOW (ref 8.6–10.2)
CALCIUM SERPL-MCNC: 7.7 MG/DL — LOW (ref 8.6–10.2)
CALCIUM SERPL-MCNC: 9.4 MG/DL — SIGNIFICANT CHANGE UP (ref 8.6–10.2)
CHLORIDE BLDV-SCNC: 113 MMOL/L — HIGH (ref 98–106)
CHLORIDE BLDV-SCNC: 114 MMOL/L — HIGH (ref 98–106)
CHLORIDE SERPL-SCNC: 101 MMOL/L — SIGNIFICANT CHANGE UP (ref 98–107)
CHLORIDE SERPL-SCNC: 111 MMOL/L — HIGH (ref 98–107)
CHLORIDE SERPL-SCNC: 112 MMOL/L — HIGH (ref 98–107)
CK SERPL-CCNC: 489 U/L — HIGH (ref 30–200)
CO2 SERPL-SCNC: 20 MMOL/L — LOW (ref 22–29)
CO2 SERPL-SCNC: 22 MMOL/L — SIGNIFICANT CHANGE UP (ref 22–29)
CO2 SERPL-SCNC: 24 MMOL/L — SIGNIFICANT CHANGE UP (ref 22–29)
CREAT SERPL-MCNC: 0.92 MG/DL — SIGNIFICANT CHANGE UP (ref 0.5–1.3)
CREAT SERPL-MCNC: 1.08 MG/DL — SIGNIFICANT CHANGE UP (ref 0.5–1.3)
CREAT SERPL-MCNC: 1.11 MG/DL — SIGNIFICANT CHANGE UP (ref 0.5–1.3)
FOLATE SERPL-MCNC: 14.8 NG/ML — SIGNIFICANT CHANGE UP (ref 4–16)
GAS PNL BLDA: SIGNIFICANT CHANGE UP
GAS PNL BLDV: 145 MMOL/L — SIGNIFICANT CHANGE UP (ref 136–146)
GAS PNL BLDV: 147 MMOL/L — HIGH (ref 136–146)
GAS PNL BLDV: SIGNIFICANT CHANGE UP
GLUCOSE BLDV-MCNC: 139 MG/DL — HIGH (ref 70–99)
GLUCOSE BLDV-MCNC: 149 MG/DL — HIGH (ref 70–99)
GLUCOSE SERPL-MCNC: 108 MG/DL — SIGNIFICANT CHANGE UP (ref 70–115)
GLUCOSE SERPL-MCNC: 141 MG/DL — HIGH (ref 70–115)
GLUCOSE SERPL-MCNC: 194 MG/DL — HIGH (ref 70–115)
HCO3 BLDV-SCNC: 23 MMOL/L — SIGNIFICANT CHANGE UP (ref 20–26)
HCO3 BLDV-SCNC: 24 MMOL/L — SIGNIFICANT CHANGE UP (ref 20–26)
HCT VFR BLD CALC: 34.8 % — LOW (ref 42–52)
HCT VFR BLD CALC: 42.1 % — SIGNIFICANT CHANGE UP (ref 42–52)
HCT VFR BLD CALC: 45.3 % — SIGNIFICANT CHANGE UP (ref 42–52)
HCT VFR BLDA CALC: 32 — LOW (ref 39–50)
HCT VFR BLDA CALC: 36 — LOW (ref 39–50)
HGB BLD CALC-MCNC: 10.5 G/DL — LOW (ref 13–17)
HGB BLD CALC-MCNC: 11.7 G/DL — LOW (ref 13–17)
HGB BLD-MCNC: 10.9 G/DL — LOW (ref 14–18)
HGB BLD-MCNC: 13.3 G/DL — LOW (ref 14–18)
HGB BLD-MCNC: 14.5 G/DL — SIGNIFICANT CHANGE UP (ref 14–18)
HOROWITZ INDEX BLDV+IHG-RTO: 40 — SIGNIFICANT CHANGE UP
INR BLD: 1.48 RATIO — HIGH (ref 0.88–1.16)
LACTATE BLDV-MCNC: 4.5 MMOL/L — CRITICAL HIGH (ref 0.5–1.6)
LACTATE BLDV-MCNC: 4.7 MMOL/L — CRITICAL HIGH (ref 0.5–1.6)
MAGNESIUM SERPL-MCNC: 1.6 MG/DL — LOW (ref 1.8–2.5)
MAGNESIUM SERPL-MCNC: 2.8 MG/DL — HIGH (ref 1.8–2.5)
MCHC RBC-ENTMCNC: 28.8 PG — SIGNIFICANT CHANGE UP (ref 27–31)
MCHC RBC-ENTMCNC: 29.1 PG — SIGNIFICANT CHANGE UP (ref 27–31)
MCHC RBC-ENTMCNC: 29.3 PG — SIGNIFICANT CHANGE UP (ref 27–31)
MCHC RBC-ENTMCNC: 31.3 G/DL — LOW (ref 32–36)
MCHC RBC-ENTMCNC: 31.6 G/DL — LOW (ref 32–36)
MCHC RBC-ENTMCNC: 32 G/DL — SIGNIFICANT CHANGE UP (ref 32–36)
MCV RBC AUTO: 91.5 FL — SIGNIFICANT CHANGE UP (ref 80–94)
MCV RBC AUTO: 91.8 FL — SIGNIFICANT CHANGE UP (ref 80–94)
MCV RBC AUTO: 92.1 FL — SIGNIFICANT CHANGE UP (ref 80–94)
OTHER CELLS CSF MANUAL: 12 ML/DL — LOW (ref 18–22)
OTHER CELLS CSF MANUAL: 13 ML/DL — LOW (ref 18–22)
PCO2 BLDV: 42 MMHG — SIGNIFICANT CHANGE UP (ref 35–50)
PCO2 BLDV: 51 MMHG — HIGH (ref 35–50)
PH BLDV: 7.34 — LOW (ref 7.35–7.45)
PH BLDV: 7.36 — SIGNIFICANT CHANGE UP (ref 7.35–7.45)
PLATELET # BLD AUTO: 115 K/UL — LOW (ref 150–400)
PLATELET # BLD AUTO: 145 K/UL — LOW (ref 150–400)
PLATELET # BLD AUTO: 147 K/UL — LOW (ref 150–400)
PO2 BLDV: 47 MMHG — HIGH (ref 25–45)
PO2 BLDV: 48 MMHG — HIGH (ref 25–45)
POTASSIUM BLDV-SCNC: 3.7 MMOL/L — SIGNIFICANT CHANGE UP (ref 3.4–4.5)
POTASSIUM BLDV-SCNC: 4.2 MMOL/L — SIGNIFICANT CHANGE UP (ref 3.4–4.5)
POTASSIUM SERPL-MCNC: 4.3 MMOL/L — SIGNIFICANT CHANGE UP (ref 3.5–5.3)
POTASSIUM SERPL-MCNC: 4.3 MMOL/L — SIGNIFICANT CHANGE UP (ref 3.5–5.3)
POTASSIUM SERPL-MCNC: 4.7 MMOL/L — SIGNIFICANT CHANGE UP (ref 3.5–5.3)
POTASSIUM SERPL-SCNC: 4.3 MMOL/L — SIGNIFICANT CHANGE UP (ref 3.5–5.3)
POTASSIUM SERPL-SCNC: 4.3 MMOL/L — SIGNIFICANT CHANGE UP (ref 3.5–5.3)
POTASSIUM SERPL-SCNC: 4.7 MMOL/L — SIGNIFICANT CHANGE UP (ref 3.5–5.3)
PROT SERPL-MCNC: 5.7 G/DL — LOW (ref 6.6–8.7)
PROTHROM AB SERPL-ACNC: 16.3 SEC — HIGH (ref 10–13.1)
RBC # BLD: 3.79 M/UL — LOW (ref 4.6–6.2)
RBC # BLD: 4.57 M/UL — LOW (ref 4.6–6.2)
RBC # BLD: 4.95 M/UL — SIGNIFICANT CHANGE UP (ref 4.6–6.2)
RBC # BLD: 4.95 M/UL — SIGNIFICANT CHANGE UP (ref 4.6–6.2)
RBC # FLD: 15.1 % — SIGNIFICANT CHANGE UP (ref 11–15.6)
RBC # FLD: 15.3 % — SIGNIFICANT CHANGE UP (ref 11–15.6)
RBC # FLD: 15.6 % — SIGNIFICANT CHANGE UP (ref 11–15.6)
RETICS #: 45 K/UL — SIGNIFICANT CHANGE UP (ref 25–125)
RETICS/RBC NFR: 0.9 % — SIGNIFICANT CHANGE UP (ref 0.5–2.6)
SAO2 % BLDV: 80 % — SIGNIFICANT CHANGE UP (ref 67–88)
SAO2 % BLDV: 81 % — SIGNIFICANT CHANGE UP (ref 67–88)
SODIUM SERPL-SCNC: 138 MMOL/L — SIGNIFICANT CHANGE UP (ref 135–145)
SODIUM SERPL-SCNC: 145 MMOL/L — SIGNIFICANT CHANGE UP (ref 135–145)
SODIUM SERPL-SCNC: 149 MMOL/L — HIGH (ref 135–145)
TROPONIN T SERPL-MCNC: 0.87 NG/ML — CRITICAL HIGH (ref 0–0.06)
VIT B12 SERPL-MCNC: 733 PG/ML — SIGNIFICANT CHANGE UP (ref 180–914)
WBC # BLD: 15.66 K/UL — HIGH (ref 4.8–10.8)
WBC # BLD: 21.19 K/UL — HIGH (ref 4.8–10.8)
WBC # BLD: 6.95 K/UL — SIGNIFICANT CHANGE UP (ref 4.8–10.8)
WBC # FLD AUTO: 15.66 K/UL — HIGH (ref 4.8–10.8)
WBC # FLD AUTO: 21.19 K/UL — HIGH (ref 4.8–10.8)
WBC # FLD AUTO: 6.95 K/UL — SIGNIFICANT CHANGE UP (ref 4.8–10.8)

## 2017-02-06 PROCEDURE — 33430 REPLACEMENT OF MITRAL VALVE: CPT | Mod: AS

## 2017-02-06 PROCEDURE — 88311 DECALCIFY TISSUE: CPT | Mod: 26

## 2017-02-06 PROCEDURE — 93355 ECHO TRANSESOPHAGEAL (TEE): CPT

## 2017-02-06 PROCEDURE — 33430 REPLACEMENT OF MITRAL VALVE: CPT

## 2017-02-06 PROCEDURE — 33860: CPT

## 2017-02-06 PROCEDURE — 88304 TISSUE EXAM BY PATHOLOGIST: CPT | Mod: 26

## 2017-02-06 PROCEDURE — 71010: CPT | Mod: 26

## 2017-02-06 PROCEDURE — 93010 ELECTROCARDIOGRAM REPORT: CPT

## 2017-02-06 PROCEDURE — 33860: CPT | Mod: AS

## 2017-02-06 PROCEDURE — 88305 TISSUE EXAM BY PATHOLOGIST: CPT | Mod: 26

## 2017-02-06 PROCEDURE — 33405 REPLACEMENT AORTIC VALVE OPN: CPT

## 2017-02-06 PROCEDURE — 33405 REPLACEMENT AORTIC VALVE OPN: CPT | Mod: AS

## 2017-02-06 RX ORDER — POTASSIUM CHLORIDE 20 MEQ
10 PACKET (EA) ORAL
Qty: 0 | Refills: 0 | Status: DISCONTINUED | OUTPATIENT
Start: 2017-02-06 | End: 2017-02-07

## 2017-02-06 RX ORDER — INSULIN HUMAN 100 [IU]/ML
1 INJECTION, SOLUTION SUBCUTANEOUS
Qty: 250 | Refills: 0 | Status: DISCONTINUED | OUTPATIENT
Start: 2017-02-06 | End: 2017-02-07

## 2017-02-06 RX ORDER — DOCUSATE SODIUM 100 MG
100 CAPSULE ORAL THREE TIMES A DAY
Qty: 0 | Refills: 0 | Status: DISCONTINUED | OUTPATIENT
Start: 2017-02-06 | End: 2017-02-17

## 2017-02-06 RX ORDER — ALBUMIN HUMAN 25 %
250 VIAL (ML) INTRAVENOUS ONCE
Qty: 0 | Refills: 0 | Status: COMPLETED | OUTPATIENT
Start: 2017-02-06 | End: 2017-02-06

## 2017-02-06 RX ORDER — FENTANYL CITRATE 50 UG/ML
25 INJECTION INTRAVENOUS ONCE
Qty: 0 | Refills: 0 | Status: DISCONTINUED | OUTPATIENT
Start: 2017-02-06 | End: 2017-02-06

## 2017-02-06 RX ORDER — SODIUM CHLORIDE 9 MG/ML
1000 INJECTION, SOLUTION INTRAVENOUS ONCE
Qty: 0 | Refills: 0 | Status: COMPLETED | OUTPATIENT
Start: 2017-02-06 | End: 2017-02-06

## 2017-02-06 RX ORDER — PANTOPRAZOLE SODIUM 20 MG/1
40 TABLET, DELAYED RELEASE ORAL DAILY
Qty: 0 | Refills: 0 | Status: DISCONTINUED | OUTPATIENT
Start: 2017-02-06 | End: 2017-02-07

## 2017-02-06 RX ORDER — FENTANYL CITRATE 50 UG/ML
25 INJECTION INTRAVENOUS
Qty: 0 | Refills: 0 | Status: DISCONTINUED | OUTPATIENT
Start: 2017-02-06 | End: 2017-02-07

## 2017-02-06 RX ORDER — MAGNESIUM SULFATE 500 MG/ML
2 VIAL (ML) INJECTION ONCE
Qty: 0 | Refills: 0 | Status: COMPLETED | OUTPATIENT
Start: 2017-02-06 | End: 2017-02-06

## 2017-02-06 RX ORDER — AMIODARONE HYDROCHLORIDE 400 MG/1
150 TABLET ORAL ONCE
Qty: 0 | Refills: 0 | Status: COMPLETED | OUTPATIENT
Start: 2017-02-06 | End: 2017-02-06

## 2017-02-06 RX ORDER — CEFUROXIME AXETIL 250 MG
1500 TABLET ORAL EVERY 8 HOURS
Qty: 0 | Refills: 0 | Status: COMPLETED | OUTPATIENT
Start: 2017-02-06 | End: 2017-02-08

## 2017-02-06 RX ORDER — VANCOMYCIN HCL 1 G
1000 VIAL (EA) INTRAVENOUS ONCE
Qty: 0 | Refills: 0 | Status: DISCONTINUED | OUTPATIENT
Start: 2017-02-06 | End: 2017-02-06

## 2017-02-06 RX ORDER — MAGNESIUM SULFATE 500 MG/ML
2 VIAL (ML) INJECTION
Qty: 0 | Refills: 0 | Status: DISCONTINUED | OUTPATIENT
Start: 2017-02-06 | End: 2017-02-06

## 2017-02-06 RX ORDER — SODIUM BICARBONATE 1 MEQ/ML
50 SYRINGE (ML) INTRAVENOUS ONCE
Qty: 0 | Refills: 0 | Status: COMPLETED | OUTPATIENT
Start: 2017-02-06 | End: 2017-02-06

## 2017-02-06 RX ORDER — DOBUTAMINE HCL 250MG/20ML
5 VIAL (ML) INTRAVENOUS
Qty: 500 | Refills: 0 | Status: DISCONTINUED | OUTPATIENT
Start: 2017-02-06 | End: 2017-02-08

## 2017-02-06 RX ORDER — MILRINONE LACTATE 1 MG/ML
0.2 INJECTION, SOLUTION INTRAVENOUS
Qty: 20 | Refills: 0 | Status: DISCONTINUED | OUTPATIENT
Start: 2017-02-06 | End: 2017-02-10

## 2017-02-06 RX ORDER — SODIUM CHLORIDE 9 MG/ML
1000 INJECTION INTRAMUSCULAR; INTRAVENOUS; SUBCUTANEOUS
Qty: 0 | Refills: 0 | Status: DISCONTINUED | OUTPATIENT
Start: 2017-02-06 | End: 2017-02-10

## 2017-02-06 RX ORDER — PROPOFOL 10 MG/ML
11.85 INJECTION, EMULSION INTRAVENOUS
Qty: 1000 | Refills: 0 | Status: DISCONTINUED | OUTPATIENT
Start: 2017-02-06 | End: 2017-02-07

## 2017-02-06 RX ORDER — SODIUM CHLORIDE 9 MG/ML
250 INJECTION, SOLUTION INTRAVENOUS ONCE
Qty: 0 | Refills: 0 | Status: COMPLETED | OUTPATIENT
Start: 2017-02-06 | End: 2017-02-06

## 2017-02-06 RX ORDER — CHLORHEXIDINE GLUCONATE 213 G/1000ML
5 SOLUTION TOPICAL EVERY 4 HOURS
Qty: 0 | Refills: 0 | Status: DISCONTINUED | OUTPATIENT
Start: 2017-02-06 | End: 2017-02-07

## 2017-02-06 RX ORDER — POTASSIUM CHLORIDE 20 MEQ
10 PACKET (EA) ORAL
Qty: 0 | Refills: 0 | Status: COMPLETED | OUTPATIENT
Start: 2017-02-06 | End: 2017-02-06

## 2017-02-06 RX ORDER — EPINEPHRINE 0.3 MG/.3ML
0.04 INJECTION INTRAMUSCULAR; SUBCUTANEOUS
Qty: 4 | Refills: 0 | Status: DISCONTINUED | OUTPATIENT
Start: 2017-02-06 | End: 2017-02-07

## 2017-02-06 RX ORDER — ASPIRIN/CALCIUM CARB/MAGNESIUM 324 MG
325 TABLET ORAL DAILY
Qty: 0 | Refills: 0 | Status: DISCONTINUED | OUTPATIENT
Start: 2017-02-06 | End: 2017-02-07

## 2017-02-06 RX ORDER — POTASSIUM CHLORIDE 20 MEQ
10 PACKET (EA) ORAL ONCE
Qty: 0 | Refills: 0 | Status: DISCONTINUED | OUTPATIENT
Start: 2017-02-06 | End: 2017-02-07

## 2017-02-06 RX ORDER — CALCIUM GLUCONATE 100 MG/ML
1 VIAL (ML) INTRAVENOUS ONCE
Qty: 0 | Refills: 0 | Status: COMPLETED | OUTPATIENT
Start: 2017-02-06 | End: 2017-02-06

## 2017-02-06 RX ORDER — AMIODARONE HYDROCHLORIDE 400 MG/1
400 TABLET ORAL EVERY 8 HOURS
Qty: 0 | Refills: 0 | Status: DISCONTINUED | OUTPATIENT
Start: 2017-02-06 | End: 2017-02-09

## 2017-02-06 RX ORDER — VANCOMYCIN HCL 1 G
1000 VIAL (EA) INTRAVENOUS EVERY 12 HOURS
Qty: 0 | Refills: 0 | Status: COMPLETED | OUTPATIENT
Start: 2017-02-06 | End: 2017-02-08

## 2017-02-06 RX ORDER — NOREPINEPHRINE BITARTRATE/D5W 8 MG/250ML
0.04 PLASTIC BAG, INJECTION (ML) INTRAVENOUS
Qty: 8 | Refills: 0 | Status: DISCONTINUED | OUTPATIENT
Start: 2017-02-06 | End: 2017-02-07

## 2017-02-06 RX ADMIN — SODIUM CHLORIDE 2000 MILLILITER(S): 9 INJECTION, SOLUTION INTRAVENOUS at 17:20

## 2017-02-06 RX ADMIN — CHLORHEXIDINE GLUCONATE 5 MILLILITER(S): 213 SOLUTION TOPICAL at 19:38

## 2017-02-06 RX ADMIN — FENTANYL CITRATE 25 MICROGRAM(S): 50 INJECTION INTRAVENOUS at 22:50

## 2017-02-06 RX ADMIN — Medication 100 MILLIEQUIVALENT(S): at 21:14

## 2017-02-06 RX ADMIN — Medication 12.5 MILLIGRAM(S): at 06:59

## 2017-02-06 RX ADMIN — AMIODARONE HYDROCHLORIDE 618 MILLIGRAM(S): 400 TABLET ORAL at 16:00

## 2017-02-06 RX ADMIN — FENTANYL CITRATE 25 MICROGRAM(S): 50 INJECTION INTRAVENOUS at 21:17

## 2017-02-06 RX ADMIN — Medication 125 MILLILITER(S): at 20:36

## 2017-02-06 RX ADMIN — Medication 100 MILLIEQUIVALENT(S): at 20:30

## 2017-02-06 RX ADMIN — PANTOPRAZOLE SODIUM 40 MILLIGRAM(S): 20 TABLET, DELAYED RELEASE ORAL at 22:52

## 2017-02-06 RX ADMIN — FENTANYL CITRATE 25 MICROGRAM(S): 50 INJECTION INTRAVENOUS at 23:05

## 2017-02-06 RX ADMIN — FENTANYL CITRATE 25 MICROGRAM(S): 50 INJECTION INTRAVENOUS at 21:50

## 2017-02-06 RX ADMIN — PROPOFOL 5 MICROGRAM(S)/KG/MIN: 10 INJECTION, EMULSION INTRAVENOUS at 18:28

## 2017-02-06 RX ADMIN — Medication 10.54 MICROGRAM(S)/KG/MIN: at 18:28

## 2017-02-06 RX ADMIN — Medication 125 MILLILITER(S): at 22:01

## 2017-02-06 RX ADMIN — Medication 100 MILLIEQUIVALENT(S): at 20:00

## 2017-02-06 RX ADMIN — Medication 100 MILLIEQUIVALENT(S): at 19:50

## 2017-02-06 RX ADMIN — Medication 250 MILLIGRAM(S): at 22:53

## 2017-02-06 RX ADMIN — FENTANYL CITRATE 25 MICROGRAM(S): 50 INJECTION INTRAVENOUS at 21:32

## 2017-02-06 RX ADMIN — Medication 100 MILLIEQUIVALENT(S): at 23:00

## 2017-02-06 RX ADMIN — Medication 125 MILLILITER(S): at 17:30

## 2017-02-06 RX ADMIN — SODIUM CHLORIDE 1000 MILLILITER(S): 9 INJECTION, SOLUTION INTRAVENOUS at 19:46

## 2017-02-06 RX ADMIN — CHLORHEXIDINE GLUCONATE 1 APPLICATION(S): 213 SOLUTION TOPICAL at 06:55

## 2017-02-06 RX ADMIN — MILRINONE LACTATE 7.91 MICROGRAM(S)/KG/MIN: 1 INJECTION, SOLUTION INTRAVENOUS at 18:29

## 2017-02-06 RX ADMIN — Medication 50 MILLIEQUIVALENT(S): at 18:55

## 2017-02-06 RX ADMIN — AMIODARONE HYDROCHLORIDE 400 MILLIGRAM(S): 400 TABLET ORAL at 22:52

## 2017-02-06 RX ADMIN — Medication 50 MILLIEQUIVALENT(S): at 19:25

## 2017-02-06 RX ADMIN — Medication 50 GRAM(S): at 18:12

## 2017-02-06 RX ADMIN — INSULIN HUMAN 1 UNIT(S)/HR: 100 INJECTION, SOLUTION SUBCUTANEOUS at 18:09

## 2017-02-06 RX ADMIN — Medication 200 GRAM(S): at 20:35

## 2017-02-06 RX ADMIN — Medication 100 MILLIGRAM(S): at 20:15

## 2017-02-06 RX ADMIN — Medication 50 GRAM(S): at 16:00

## 2017-02-06 RX ADMIN — CHLORHEXIDINE GLUCONATE 5 MILLILITER(S): 213 SOLUTION TOPICAL at 22:52

## 2017-02-06 RX ADMIN — Medication 125 MILLILITER(S): at 19:25

## 2017-02-06 RX ADMIN — FENTANYL CITRATE 25 MICROGRAM(S): 50 INJECTION INTRAVENOUS at 22:05

## 2017-02-06 RX ADMIN — Medication 5 MICROGRAM(S)/KG/MIN: at 18:28

## 2017-02-06 RX ADMIN — PANTOPRAZOLE SODIUM 40 MILLIGRAM(S): 20 TABLET, DELAYED RELEASE ORAL at 06:59

## 2017-02-06 RX ADMIN — CHLORHEXIDINE GLUCONATE 15 MILLILITER(S): 213 SOLUTION TOPICAL at 06:59

## 2017-02-06 RX ADMIN — Medication 100 MILLIEQUIVALENT(S): at 22:00

## 2017-02-06 RX ADMIN — EPINEPHRINE 10 MICROGRAM(S)/KG/MIN: 0.3 INJECTION INTRAMUSCULAR; SUBCUTANEOUS at 18:28

## 2017-02-06 NOTE — BRIEF OPERATIVE NOTE - PROCEDURE
Ascending aortic aneurysm repair  02/06/2017  Ascending Aortic Aneurysm Repair with 30mm Hemoshield Graft  Active  [id number]  Replacement of mitral valve  02/06/2017  MVR(T) 33mm Medtronic Mejia II  Active  [email or web address]  Aortic valve replacement  02/06/2017  AVR(T) 27mm Barrera 2700 TFX  Active  [email or web address]

## 2017-02-06 NOTE — BRIEF OPERATIVE NOTE - POST-OP DX
Aortic aneurysm  02/06/2017    Juan Calle  Aortic insufficiency  02/06/2017    Juan Calle  Aortic stenosis  02/06/2017    Juan Calle  Mitral regurgitation  02/06/2017    Juan Calle

## 2017-02-06 NOTE — BRIEF OPERATIVE NOTE - PRE-OP DX
Aortic aneurysm  02/06/2017    Juan Calle  Aortic insufficiency  02/06/2017    uJan Calle  Aortic stenosis  02/06/2017    Juan Calle  Mitral regurgitation  02/06/2017    Juan Calle

## 2017-02-07 PROBLEM — Z00.00 ENCOUNTER FOR PREVENTIVE HEALTH EXAMINATION: Status: ACTIVE | Noted: 2017-02-07

## 2017-02-07 LAB
ALBUMIN SERPL ELPH-MCNC: 3.8 G/DL — SIGNIFICANT CHANGE UP (ref 3.3–5.2)
ALP SERPL-CCNC: 64 U/L — SIGNIFICANT CHANGE UP (ref 40–120)
ALT FLD-CCNC: 56 U/L — HIGH
ANION GAP SERPL CALC-SCNC: 12 MMOL/L — SIGNIFICANT CHANGE UP (ref 5–17)
APTT BLD: 33.8 SEC — SIGNIFICANT CHANGE UP (ref 27.5–37.4)
AST SERPL-CCNC: 73 U/L — HIGH
BILIRUB SERPL-MCNC: 1.5 MG/DL — SIGNIFICANT CHANGE UP (ref 0.4–2)
BUN SERPL-MCNC: 22 MG/DL — HIGH (ref 8–20)
CALCIUM SERPL-MCNC: 8 MG/DL — LOW (ref 8.6–10.2)
CHLORIDE SERPL-SCNC: 113 MMOL/L — HIGH (ref 98–107)
CK MB CFR SERPL CALC: 40.6 NG/ML — HIGH (ref 0–6.7)
CK SERPL-CCNC: 424 U/L — HIGH (ref 30–200)
CO2 SERPL-SCNC: 23 MMOL/L — SIGNIFICANT CHANGE UP (ref 22–29)
CREAT SERPL-MCNC: 0.98 MG/DL — SIGNIFICANT CHANGE UP (ref 0.5–1.3)
GAS PNL BLDA: SIGNIFICANT CHANGE UP
GLUCOSE SERPL-MCNC: 99 MG/DL — SIGNIFICANT CHANGE UP (ref 70–115)
HCT VFR BLD CALC: 32.9 % — LOW (ref 42–52)
HGB BLD-MCNC: 10.4 G/DL — LOW (ref 14–18)
INR BLD: 1.28 RATIO — HIGH (ref 0.88–1.16)
MAGNESIUM SERPL-MCNC: 2.4 MG/DL — SIGNIFICANT CHANGE UP (ref 1.8–2.5)
MCHC RBC-ENTMCNC: 28.7 PG — SIGNIFICANT CHANGE UP (ref 27–31)
MCHC RBC-ENTMCNC: 31.6 G/DL — LOW (ref 32–36)
MCV RBC AUTO: 90.6 FL — SIGNIFICANT CHANGE UP (ref 80–94)
PLATELET # BLD AUTO: 117 K/UL — LOW (ref 150–400)
POTASSIUM SERPL-MCNC: 4.4 MMOL/L — SIGNIFICANT CHANGE UP (ref 3.5–5.3)
POTASSIUM SERPL-SCNC: 4.4 MMOL/L — SIGNIFICANT CHANGE UP (ref 3.5–5.3)
PROT SERPL-MCNC: 5.4 G/DL — LOW (ref 6.6–8.7)
PROTHROM AB SERPL-ACNC: 14.1 SEC — HIGH (ref 10–13.1)
RBC # BLD: 3.63 M/UL — LOW (ref 4.6–6.2)
RBC # FLD: 15.1 % — SIGNIFICANT CHANGE UP (ref 11–15.6)
SODIUM SERPL-SCNC: 148 MMOL/L — HIGH (ref 135–145)
TROPONIN T SERPL-MCNC: 1 NG/ML — CRITICAL HIGH (ref 0–0.06)
WBC # BLD: 11.96 K/UL — HIGH (ref 4.8–10.8)
WBC # FLD AUTO: 11.96 K/UL — HIGH (ref 4.8–10.8)

## 2017-02-07 PROCEDURE — 93010 ELECTROCARDIOGRAM REPORT: CPT

## 2017-02-07 PROCEDURE — 71010: CPT | Mod: 26

## 2017-02-07 RX ORDER — PANTOPRAZOLE SODIUM 20 MG/1
40 TABLET, DELAYED RELEASE ORAL
Qty: 0 | Refills: 0 | Status: DISCONTINUED | OUTPATIENT
Start: 2017-02-07 | End: 2017-02-17

## 2017-02-07 RX ORDER — POTASSIUM CHLORIDE 20 MEQ
10 PACKET (EA) ORAL
Qty: 0 | Refills: 0 | Status: COMPLETED | OUTPATIENT
Start: 2017-02-07 | End: 2017-02-07

## 2017-02-07 RX ORDER — MAGNESIUM SULFATE 500 MG/ML
2 VIAL (ML) INJECTION ONCE
Qty: 0 | Refills: 0 | Status: COMPLETED | OUTPATIENT
Start: 2017-02-07 | End: 2017-02-07

## 2017-02-07 RX ORDER — WARFARIN SODIUM 2.5 MG/1
5 TABLET ORAL ONCE
Qty: 0 | Refills: 0 | Status: COMPLETED | OUTPATIENT
Start: 2017-02-07 | End: 2017-02-07

## 2017-02-07 RX ORDER — ASPIRIN/CALCIUM CARB/MAGNESIUM 324 MG
325 TABLET ORAL DAILY
Qty: 0 | Refills: 0 | Status: DISCONTINUED | OUTPATIENT
Start: 2017-02-07 | End: 2017-02-17

## 2017-02-07 RX ORDER — INSULIN LISPRO 100/ML
VIAL (ML) SUBCUTANEOUS
Qty: 0 | Refills: 0 | Status: DISCONTINUED | OUTPATIENT
Start: 2017-02-07 | End: 2017-02-09

## 2017-02-07 RX ORDER — ENOXAPARIN SODIUM 100 MG/ML
40 INJECTION SUBCUTANEOUS DAILY
Qty: 0 | Refills: 0 | Status: DISCONTINUED | OUTPATIENT
Start: 2017-02-07 | End: 2017-02-09

## 2017-02-07 RX ORDER — AMIODARONE HYDROCHLORIDE 400 MG/1
150 TABLET ORAL ONCE
Qty: 0 | Refills: 0 | Status: COMPLETED | OUTPATIENT
Start: 2017-02-07 | End: 2017-02-07

## 2017-02-07 RX ORDER — ACETAMINOPHEN 500 MG
1000 TABLET ORAL ONCE
Qty: 0 | Refills: 0 | Status: COMPLETED | OUTPATIENT
Start: 2017-02-07 | End: 2017-02-07

## 2017-02-07 RX ADMIN — Medication 100 MILLIGRAM(S): at 22:28

## 2017-02-07 RX ADMIN — Medication 50 MILLIEQUIVALENT(S): at 02:41

## 2017-02-07 RX ADMIN — ENOXAPARIN SODIUM 40 MILLIGRAM(S): 100 INJECTION SUBCUTANEOUS at 12:07

## 2017-02-07 RX ADMIN — Medication 100 MILLIGRAM(S): at 14:58

## 2017-02-07 RX ADMIN — EPINEPHRINE 10 MICROGRAM(S)/KG/MIN: 0.3 INJECTION INTRAMUSCULAR; SUBCUTANEOUS at 09:38

## 2017-02-07 RX ADMIN — Medication 50 GRAM(S): at 05:02

## 2017-02-07 RX ADMIN — MILRINONE LACTATE 7.91 MICROGRAM(S)/KG/MIN: 1 INJECTION, SOLUTION INTRAVENOUS at 09:38

## 2017-02-07 RX ADMIN — AMIODARONE HYDROCHLORIDE 400 MILLIGRAM(S): 400 TABLET ORAL at 02:43

## 2017-02-07 RX ADMIN — Medication 2: at 22:33

## 2017-02-07 RX ADMIN — Medication 325 MILLIGRAM(S): at 12:08

## 2017-02-07 RX ADMIN — Medication 1000 MILLIGRAM(S): at 04:53

## 2017-02-07 RX ADMIN — Medication 100 MILLIGRAM(S): at 10:57

## 2017-02-07 RX ADMIN — AMIODARONE HYDROCHLORIDE 618 MILLIGRAM(S): 400 TABLET ORAL at 02:40

## 2017-02-07 RX ADMIN — Medication 250 MILLIGRAM(S): at 09:30

## 2017-02-07 RX ADMIN — Medication 5 MICROGRAM(S)/KG/MIN: at 09:38

## 2017-02-07 RX ADMIN — WARFARIN SODIUM 5 MILLIGRAM(S): 2.5 TABLET ORAL at 22:28

## 2017-02-07 RX ADMIN — Medication 250 MILLIGRAM(S): at 22:29

## 2017-02-07 RX ADMIN — AMIODARONE HYDROCHLORIDE 400 MILLIGRAM(S): 400 TABLET ORAL at 12:08

## 2017-02-07 RX ADMIN — Medication 100 MILLIGRAM(S): at 18:16

## 2017-02-07 RX ADMIN — PANTOPRAZOLE SODIUM 40 MILLIGRAM(S): 20 TABLET, DELAYED RELEASE ORAL at 09:30

## 2017-02-07 RX ADMIN — Medication 10.54 MICROGRAM(S)/KG/MIN: at 09:38

## 2017-02-07 RX ADMIN — AMIODARONE HYDROCHLORIDE 400 MILLIGRAM(S): 400 TABLET ORAL at 20:32

## 2017-02-07 RX ADMIN — CHLORHEXIDINE GLUCONATE 5 MILLILITER(S): 213 SOLUTION TOPICAL at 03:07

## 2017-02-07 RX ADMIN — Medication 400 MILLIGRAM(S): at 04:38

## 2017-02-07 RX ADMIN — Medication 100 MILLIGRAM(S): at 03:07

## 2017-02-07 RX ADMIN — Medication 100 MILLIGRAM(S): at 06:39

## 2017-02-07 NOTE — PHARMACY COMMUNICATION NOTE - COMMENTS
Spoke with COBY Tee regarding patient being on oral loading dose of amiodarone.  PA wants to give the IV dose on top of the oral loading dose.

## 2017-02-07 NOTE — PROGRESS NOTE ADULT - ASSESSMENT
Mr. Zapata is seen on follow up for fluctuating thrombocytopenia.  His platelet count is spontaneously improving and remains over 100,000.  He has elevated LFT's, (?) drinker.  Etiology of decreased platelets may be related to possible hepatic pathology/congestion..  Rec:  Continue to monitor platelet count, remains hematologically stable.

## 2017-02-07 NOTE — PROGRESS NOTE ADULT - ASSESSMENT
61y Male former smoker with no significant past medical history who went to [facility] for rapidly progressing SOB and occasional chest pain, (CCS class 3 anginal equivalent, NYHA class 3 heart failure). He states BLE edema, > 3 pillow orthopnea, palpitations, and claudication.  He had SOB and chest pain while in [place] and went to a doctor who gave him a medication that he does not recall the name of.  The illness progressed and [place] the [place] he went to [facility]. His echo demonstrated a global hypokinesis and it showed an EF of 15-20%. He had a cath which demonstrated normal coronaries and he is being admitted for acute systolic heart failure.  Patient is now s/p AVR(t), MVR(t) and ascending aorta repair.  -N: wean sedation, obtain neuro exam, pain management PRN  -CV: flotrac and cardiac monitor wean inotropes and pressors as tolerated at direction of Dr. Carter, patient maintaing SBP  and MAP~70.  Lactate downtrending  -Resp: continue vent support as needed, wean to extubated as tolerated  -GI: NPO, GI prophy  -; monitor and replete lytes, monitor I&O  -Heme: Aspirin, monitor H&H and platelet count  -ID: continue periop abx  -Endo: continue insulin gtt per Unit Protocol.

## 2017-02-07 NOTE — PROGRESS NOTE ADULT - SUBJECTIVE AND OBJECTIVE BOX
Patient is a 61y old  Male who presents with a chief complaint of SOB, under cardiology care, initially seen pre-op . surgery .  Hematology consult asked to address fluctuating thrombocytopenia, unassociated with bleeding.Patient underwent uneventful cardiac surgery 2/6/17..  Now off pressors.  Patient appears comfortable and indicates no current complaints.In no pain, breathing comfortably.  PMHx:   60y/o male former smoker with no significant past medical history who went to [facility] for rapidly progressing SOB and occasional chest pain, (CCS class 3 anginal equivalent, NYHA class 3 heart failure).with reported BLE edema, > 3 pillow orthopnea, palpitations, and claudication.  He had SOB and chest pain while in [place] and went to a doctor who gave him a medication that he does not recall the name of.  The illness progressed and [place] the [place] went to [facility].  Echo showed an EF of 15-20%.    Echo: 1/24/2017       LVSF: Severely decreased with global hypokinesis and grade 1 LVDD       EF: 15-20%       RVSF: Normal       LA: Normal       RA: Normal       Mitral Valve: Mild to moderate eccentric regurgitation       Aortic Valve: Mild to moderate AR       Tricuspid Valve: Mild TR       Pulmonic Valve: Normal       Pericardium: No significant pericardial effusion (02 Feb 2017 13:07)      PAST MEDICAL & SURGICAL HISTORY:  Mitral valve insufficiency, unspecified etiology  Aortic valve insufficiency, unspecified etiology  heart failure with reduced ejection fraction  No significant past surgical history      PREVIOUS DIAGNOSTIC TESTING:      ECHO  FINDINGS:  See above    Allergies    No Known Allergies    Intolerances        MEDICATIONS  (STANDING):PRE-op  carvedilol 3.125milliGRAM(s) Oral every 12 hours  lisinopril 2.5milliGRAM(s) Oral daily  aspirin enteric coated 81milliGRAM(s) Oral daily  furosemide   Injectable 40milliGRAM(s) IV Push every 12 hours    MEDICATIONS  (PRN):    carvedilol 3.125milliGRAM(s) Oral every 12 hours  lisinopril 2.5milliGRAM(s) Oral daily  aspirin enteric coated 81milliGRAM(s) Oral daily  furosemide   Injectable 40milliGRAM(s) IV Push every 12 hours  Post op meds as documented in chart    FAMILY HISTORY:  No pertinent family history in first degree relatives      SOCIAL HISTORY:    CIGARETTES: Former    ALCOHOL: Moderate    REVIEW OF SYSTEMS:  CONSTITUTIONAL: No fever, weight loss, or fatigue  EYES: No eye pain, visual disturbances, or discharge  ENMT:  No difficulty hearing, tinnitus, vertigo; No sinus or throat pain  NECK: No pain or stiffness  RESPIRATORY: No cough, wheezing, chills or hemoptysis; No Shortness of Breath  CARDIOVASCULAR: No chest pain, palpitations, passing out, dizziness, or leg swelling  GASTROINTESTINAL: No abdominal or epigastric pain. No nausea, vomiting, or hematemesis; No diarrhea or constipation. No melena or hematochezia.  GENITOURINARY: No dysuria, frequency, hematuria, or incontinence  NEUROLOGICAL: No headaches, memory loss, loss of strength, numbness, or tremors  SKIN: No itching, burning, rashes, or lesions   LYMPH Nodes: No enlarged glands  ENDOCRINE: No heat or cold intolerance; No hair loss  MUSCULOSKELETAL: No joint pain or swelling; No muscle, back, or extremity pain  PSYCHIATRIC: No depression, anxiety, mood swings, or difficulty sleeping  HEME/LYMPH: No easy bruising, or bleeding gums  ALLERY AND IMMUNOLOGIC: No hives or eczema	    Vital Signs Last 24 Hrs  T(C): 36.4, Max: 36.4 (02-02 @ 13:35)  T(F): 97.6, Max: 97.6 (02-02 @ 13:35)  HR: 89 (89 - 95)  BP: 115/67 (114/80 - 127/62)  BP(mean): 91 (91 - 91)  RR: 18 (18 - 26)  SpO2: 98% (96% - 98%)              PHYSICAL EXAM:  Appearance: Normal, well nourished	  HEENT:   Normal oral mucosa, PERRL, EOMI, sclera non-icteric	  Lymphatic: No cervical lymphadenopathy  Cardiovascular: Normal S1 S2, No JVD, No cardiac murmurs, No carotid bruits, No peripheral edema  Respiratory: Lungs clear to auscultation	  Psychiatry: A & O x 3, Mood & affect appropriate  Gastrointestinal:  Soft, Non-tender, + BS, no bruits	  Skin: No rashes, No ecchymoses, No cyanosis  Neurologic: Grossly non-focal with full strength in all four extremities  Extremities: Normal range of motion, No clubbing, cyanosis or edema  Vascular: Peripheral pulses palpable 2+ bilaterally      INTERPRETATION OF TELEMETRY:    ECG: NSR.  LVH.  NSST changes    LABS:2/7/17:  WBC 11.96, H/H 10.4/32.9, plt ct 117,000                        13.4   5.15  )-----------( 98       ( 02 Feb 2017 13:52 )             41.2     02 Feb 2017 13:52    140    |  106    |  15.0   ----------------------------<  90     4.4     |  22.0   |  0.88     Ca    9.5        02 Feb 2017 13:52  Phos  5.0       02 Feb 2017 13:52  Mg     1.9       02 Feb 2017 13:52    TPro  6.5    /  Alb  3.6    /  TBili  1.3    /  DBili  x      /  AST  101    /  ALT  338    /  AlkPhos  130    02 Feb 2017 13:52        PT/INR - ( 02 Feb 2017 13:52 )   PT: 12.8 sec;   INR: 1.16 ratio         PTT - ( 02 Feb 2017 13:52 )  PTT:39.6 sec    I&O's Summary    BNPSerum Pro-Brain Natriuretic Peptide: 3089 pg/mL (02-02 @ 13:52)    RADIOLOGY & ADDITIONAL STUDIES:    Assessment:  HPI:  60y/o male former smoker with no significant past medical history who went to [facility] for rapidly progressing SOB and occasional chest pain, (CCS class 3 anginal equivalent, NYHA class 3 heart failure) with reported  edema, > 3 pillow orthopnea, palpitations, and claudication.  He had SOB and chest pain while in [place] and went to a doctor who gave him a medication that he does not recall the name of.  The illness progressed and [place] the [place] he went to [facility].  Echo showed an EF of 15-20%.Now recovering well post op, off pressor support, awake, alert, sitting in chair.    Echo: 1/24/2017       LVSF: Severely decreased with global hypokinesis and grade 1 LVDD       EF: 15-20%       RVSF: Normal       LA: Normal       RA: Normal       Mitral Valve: Mild to moderate eccentric regurgitation       Aortic Valve: Mild to moderate AR       Tricuspid Valve: Mild TR       Pulmonic Valve: Normal       Pericardium: No significant pericardial effusion (02 Feb 2017 13:07)

## 2017-02-07 NOTE — PROGRESS NOTE ADULT - SUBJECTIVE AND OBJECTIVE BOX
INTERVAL HISTORY:  S/P bioprosthetic AVR, MVR and ascending aortic repair.  POD#1  Already off pressors and sitting in a chair extubated  Remains on Dobutamine and Primacore with Cardiac Index above 3  Hemodynamically stable and looks good  Feels well  	  MEDICATIONS:  DOBUTamine Infusion 5MICROgram(s)/kG/Min IV Continuous <Continuous>  milrinone Infusion 0.375MICROgram(s)/kG/Min IV Continuous <Continuous>  amiodarone    Tablet 400milliGRAM(s) Oral every 8 hours    cefuroxime  IVPB 1500milliGRAM(s) IV Intermittent every 8 hours  vancomycin  IVPB 1000milliGRAM(s) IV Intermittent every 12 hours        docusate sodium 100milliGRAM(s) Oral three times a day  pantoprazole    Tablet 40milliGRAM(s) Oral before breakfast    insulin lispro (HumaLOG) corrective regimen sliding scale  SubCutaneous Before meals and at bedtime    sodium chloride 0.9%. 1000milliLiter(s) IV Continuous <Continuous>  sodium chloride 0.9%. 1000milliLiter(s) IV Continuous <Continuous>  potassium chloride  10 mEq/50 mL IVPB 10milliEquivalent(s) IV Intermittent every 1 hour  potassium chloride  10 mEq/50 mL IVPB 10milliEquivalent(s) IV Intermittent every 1 hour  potassium chloride  10 mEq/50 mL IVPB 10milliEquivalent(s) IV Intermittent once  enoxaparin Injectable 40milliGRAM(s) SubCutaneous daily  aspirin enteric coated 325milliGRAM(s) Oral daily        PHYSICAL EXAM:    T(C): 37.3, Max: 37.3 ( @ 16:00)  HR: 101 (79 - 102)  BP: --  RR: 21 (11 - 26)  SpO2: 96% (95% - 100%)  Wt(kg): --    I&O's Summary  I & Os for 24h ending 2017 07:00  =============================================  IN: 5314.1 ml / OUT: 2755 ml / NET: 2559.1 ml    I & Os for current day (as of 2017 16:29)  =============================================  IN: 719.2 ml / OUT: 1035 ml / NET: -315.8 ml      Daily     Daily Weight in k (2017 06:00)    Appearance: Normal	  HEENT:   Normal oral mucosa, PERRL, EOMI	  Lymphatic: No lymphadenopathy  Cardiovascular: Normal S1 S2, No JVD, +rub  Respiratory: Lungs clear to auscultation	  Psychiatry: A & O x 3, Mood & affect appropriate  Gastrointestinal:  Soft, Non-tender, + BS	  Skin: No rashes, No ecchymoses, No cyanosis  Neurologic: Non-focal  Extremities: Normal range of motion, No clubbing, cyanosis or edema  Vascular: Peripheral pulses palpable 2+ bilaterally                          10.4   11.96 )-----------( 117      ( 2017 02:18 )             32.9     2017 02:18    148    |  113    |  22.0   ----------------------------<  99     4.4     |  23.0   |  0.98     Ca    8.0        2017 02:18  Mg     2.4       2017 02:18    TPro  5.4    /  Alb  3.8    /  TBili  1.5    /  DBili  x      /  AST  73     /  ALT  56     /  AlkPhos  64     2017 02:18        ASSESSMENT/PLAN: 	  Severe AI and MR and EF 20% with ascending aortic aneurysm  Now s/p MVR/AVR and repair of aneurysm  Hemodynamics are very good  Not requiring pressors  Continue present management  Taper inotropes as tolerated  Incentive spirometry

## 2017-02-08 LAB
ANION GAP SERPL CALC-SCNC: 13 MMOL/L — SIGNIFICANT CHANGE UP (ref 5–17)
BUN SERPL-MCNC: 18 MG/DL — SIGNIFICANT CHANGE UP (ref 8–20)
CALCIUM SERPL-MCNC: 8.1 MG/DL — LOW (ref 8.6–10.2)
CHLORIDE SERPL-SCNC: 100 MMOL/L — SIGNIFICANT CHANGE UP (ref 98–107)
CO2 SERPL-SCNC: 22 MMOL/L — SIGNIFICANT CHANGE UP (ref 22–29)
CREAT SERPL-MCNC: 0.68 MG/DL — SIGNIFICANT CHANGE UP (ref 0.5–1.3)
GAS PNL BLDA: SIGNIFICANT CHANGE UP
GLUCOSE SERPL-MCNC: 144 MG/DL — HIGH (ref 70–115)
HCT VFR BLD CALC: 32.8 % — LOW (ref 42–52)
HGB BLD-MCNC: 10.4 G/DL — LOW (ref 14–18)
INR BLD: 1.31 RATIO — HIGH (ref 0.88–1.16)
MAGNESIUM SERPL-MCNC: 2.6 MG/DL — HIGH (ref 1.8–2.5)
MCHC RBC-ENTMCNC: 28.9 PG — SIGNIFICANT CHANGE UP (ref 27–31)
MCHC RBC-ENTMCNC: 31.7 G/DL — LOW (ref 32–36)
MCV RBC AUTO: 91.1 FL — SIGNIFICANT CHANGE UP (ref 80–94)
PLATELET # BLD AUTO: 105 K/UL — LOW (ref 150–400)
POTASSIUM SERPL-MCNC: 4.5 MMOL/L — SIGNIFICANT CHANGE UP (ref 3.5–5.3)
POTASSIUM SERPL-SCNC: 4.5 MMOL/L — SIGNIFICANT CHANGE UP (ref 3.5–5.3)
PROTHROM AB SERPL-ACNC: 14.4 SEC — HIGH (ref 10–13.1)
RBC # BLD: 3.6 M/UL — LOW (ref 4.6–6.2)
RBC # FLD: 15.5 % — SIGNIFICANT CHANGE UP (ref 11–15.6)
SODIUM SERPL-SCNC: 135 MMOL/L — SIGNIFICANT CHANGE UP (ref 135–145)
WBC # BLD: 13.57 K/UL — HIGH (ref 4.8–10.8)
WBC # FLD AUTO: 13.57 K/UL — HIGH (ref 4.8–10.8)

## 2017-02-08 PROCEDURE — 74020: CPT | Mod: 26

## 2017-02-08 PROCEDURE — 93010 ELECTROCARDIOGRAM REPORT: CPT

## 2017-02-08 PROCEDURE — 71010: CPT | Mod: 26

## 2017-02-08 RX ORDER — WARFARIN SODIUM 2.5 MG/1
5 TABLET ORAL ONCE
Qty: 0 | Refills: 0 | Status: COMPLETED | OUTPATIENT
Start: 2017-02-08 | End: 2017-02-08

## 2017-02-08 RX ORDER — TAMSULOSIN HYDROCHLORIDE 0.4 MG/1
0.4 CAPSULE ORAL AT BEDTIME
Qty: 0 | Refills: 0 | Status: DISCONTINUED | OUTPATIENT
Start: 2017-02-08 | End: 2017-02-17

## 2017-02-08 RX ORDER — FUROSEMIDE 40 MG
40 TABLET ORAL DAILY
Qty: 0 | Refills: 0 | Status: DISCONTINUED | OUTPATIENT
Start: 2017-02-09 | End: 2017-02-17

## 2017-02-08 RX ORDER — ACETAMINOPHEN 500 MG
1000 TABLET ORAL ONCE
Qty: 0 | Refills: 0 | Status: COMPLETED | OUTPATIENT
Start: 2017-02-08 | End: 2017-02-08

## 2017-02-08 RX ORDER — SODIUM CHLORIDE 9 MG/ML
3 INJECTION INTRAMUSCULAR; INTRAVENOUS; SUBCUTANEOUS EVERY 8 HOURS
Qty: 0 | Refills: 0 | Status: DISCONTINUED | OUTPATIENT
Start: 2017-02-08 | End: 2017-02-17

## 2017-02-08 RX ORDER — FUROSEMIDE 40 MG
20 TABLET ORAL ONCE
Qty: 0 | Refills: 0 | Status: COMPLETED | OUTPATIENT
Start: 2017-02-08 | End: 2017-02-08

## 2017-02-08 RX ORDER — HYDRALAZINE HCL 50 MG
10 TABLET ORAL ONCE
Qty: 0 | Refills: 0 | Status: DISCONTINUED | OUTPATIENT
Start: 2017-02-08 | End: 2017-02-08

## 2017-02-08 RX ADMIN — Medication 250 MILLIGRAM(S): at 09:51

## 2017-02-08 RX ADMIN — SODIUM CHLORIDE 3 MILLILITER(S): 9 INJECTION INTRAMUSCULAR; INTRAVENOUS; SUBCUTANEOUS at 22:08

## 2017-02-08 RX ADMIN — Medication 20 MILLIGRAM(S): at 06:00

## 2017-02-08 RX ADMIN — Medication 100 MILLIGRAM(S): at 05:19

## 2017-02-08 RX ADMIN — AMIODARONE HYDROCHLORIDE 400 MILLIGRAM(S): 400 TABLET ORAL at 11:17

## 2017-02-08 RX ADMIN — Medication 100 MILLIGRAM(S): at 13:18

## 2017-02-08 RX ADMIN — Medication 100 MILLIGRAM(S): at 09:51

## 2017-02-08 RX ADMIN — Medication 100 MILLIGRAM(S): at 02:02

## 2017-02-08 RX ADMIN — PANTOPRAZOLE SODIUM 40 MILLIGRAM(S): 20 TABLET, DELAYED RELEASE ORAL at 06:00

## 2017-02-08 RX ADMIN — Medication 325 MILLIGRAM(S): at 11:17

## 2017-02-08 RX ADMIN — WARFARIN SODIUM 5 MILLIGRAM(S): 2.5 TABLET ORAL at 22:18

## 2017-02-08 RX ADMIN — MILRINONE LACTATE 7.91 MICROGRAM(S)/KG/MIN: 1 INJECTION, SOLUTION INTRAVENOUS at 20:46

## 2017-02-08 RX ADMIN — Medication 400 MILLIGRAM(S): at 09:50

## 2017-02-08 RX ADMIN — ENOXAPARIN SODIUM 40 MILLIGRAM(S): 100 INJECTION SUBCUTANEOUS at 11:17

## 2017-02-08 RX ADMIN — TAMSULOSIN HYDROCHLORIDE 0.4 MILLIGRAM(S): 0.4 CAPSULE ORAL at 22:18

## 2017-02-08 RX ADMIN — AMIODARONE HYDROCHLORIDE 400 MILLIGRAM(S): 400 TABLET ORAL at 20:45

## 2017-02-08 RX ADMIN — Medication 1000 MILLIGRAM(S): at 10:13

## 2017-02-08 RX ADMIN — AMIODARONE HYDROCHLORIDE 400 MILLIGRAM(S): 400 TABLET ORAL at 05:19

## 2017-02-08 RX ADMIN — Medication 100 MILLIGRAM(S): at 22:19

## 2017-02-08 RX ADMIN — Medication 2: at 22:17

## 2017-02-08 NOTE — PROGRESS NOTE ADULT - SUBJECTIVE AND OBJECTIVE BOX
INTERVAL HISTORY:  Feels well  Still with chest tube  Off Dobutamine but still on Primacor  Developed Afib last night and is now on Amiodarone  	  MEDICATIONS:  milrinone Infusion 0.375MICROgram(s)/kG/Min IV Continuous <Continuous>  amiodarone    Tablet 400milliGRAM(s) Oral every 8 hours          docusate sodium 100milliGRAM(s) Oral three times a day  pantoprazole    Tablet 40milliGRAM(s) Oral before breakfast    insulin lispro (HumaLOG) corrective regimen sliding scale  SubCutaneous Before meals and at bedtime    sodium chloride 0.9%. 1000milliLiter(s) IV Continuous <Continuous>  sodium chloride 0.9%. 1000milliLiter(s) IV Continuous <Continuous>  enoxaparin Injectable 40milliGRAM(s) SubCutaneous daily  aspirin enteric coated 325milliGRAM(s) Oral daily  warfarin 5milliGRAM(s) Oral once        PHYSICAL EXAM:    T(C): 36.6, Max: 37.7 ( @ 20:00)  HR: 61 (61 - 104)  BP: 134/88 (134/88 - 134/88)  RR: 20 (10 - 26)  SpO2: 98% (93% - 100%)  Wt(kg): --    I&O's Summary  I & Os for 24h ending 2017 07:00  =============================================  IN: 2439.1 ml / OUT: 2075 ml / NET: 364.1 ml    I & Os for current day (as of 2017 16:30)  =============================================  IN: 467.7 ml / OUT: 295 ml / NET: 172.7 ml      Daily     Daily Weight in k.2 (2017 05:00)    Appearance: Normal	  HEENT:   Normal oral mucosa, PERRL, EOMI	  Lymphatic: No lymphadenopathy  Cardiovascular: Normal S1 S2, No JVD, 1-2/6 systolic murmur.  No rubs, No edema  Respiratory: Lungs clear to auscultation	  Psychiatry: A & O x 3, Mood & affect appropriate  Gastrointestinal:  Soft, Non-tender, + BS	  Skin: No rashes, No ecchymoses, No cyanosis  Neurologic: Non-focal  Extremities: Normal range of motion, No clubbing, cyanosis or edema  Vascular: Peripheral pulses palpable 2+ bilaterally                          10.4   13.57 )-----------( 105      ( 2017 03:16 )             32.8     2017 03:16    135    |  100    |  18.0   ----------------------------<  144    4.5     |  22.0   |  0.68     Ca    8.1        2017 03:16  Mg     2.6       2017 03:16    TPro  5.4    /  Alb  3.8    /  TBili  1.5    /  DBili  x      /  AST  73     /  ALT  56     /  AlkPhos  64     2017 02:18      ASSESSMENT/PLAN: 	  S/P MVR/AVR and ascending aortic aneurysm repair  Afib now on Amio  Monitor heart rhythm  Progressing very well.  Continue incentive spirometry.

## 2017-02-08 NOTE — PROGRESS NOTE ADULT - ASSESSMENT
61y Male former smoker with no significant past medical history who went to [facility] for rapidly progressing SOB and occasional chest pain, (CCS class 3 anginal equivalent, NYHA class 3 heart failure). He states BLE edema, > 3 pillow orthopnea, palpitations, and claudication.  He had SOB and chest pain while in [place] and went to a doctor who gave him a medication that he does not recall the name of.  The illness progressed and [place] the [place] he went to [facility]. His echo demonstrated a global hypokinesis and it showed an EF of 15-20%. He had a cath which demonstrated normal coronaries and he is being admitted for acute systolic heart failure.  Patient is now s/p AVR(t), MVR(t) and ascending aorta repair.  -N: neuro exam stable, pain management PRN  -CV: flotrac and cardiac monitor wean inotropes and pressors as tolerated at direction of Dr. Carter, patient maintaing SBP  and MAP~70.   -Resp: monitor O2 sat, wean supplemental O2 as tolerated  -GI: continue current diet, GI prophy  -; monitor and replete lytes, monitor I&O  -Heme: Aspirin, monitor H&H and platelet count, coumadin 5mg given.  -ID: continue periop abx  -Endo: Monitor bgl, off insulin gtt.

## 2017-02-08 NOTE — PHYSICAL THERAPY INITIAL EVALUATION ADULT - CRITERIA FOR SKILLED THERAPEUTIC INTERVENTIONS
functional limitations in following categories/rehab potential/anticipated discharge recommendation/impairments found

## 2017-02-08 NOTE — PROGRESS NOTE ADULT - SUBJECTIVE AND OBJECTIVE BOX
61y Male s/p Aortic valve replacement  Replacement of mitral valve  Ascending aortic aneurysm repair      Subjective: Patient extubated, presently no compaints    T(C): 37.7, Max: 37.7 (02-07 @ 20:00)  HR: 95 (79 - 104)  ABP: 129/67 (85/54 - 136/62)  ABP(mean): 83 (64 - 85)  RR: 22 (10 - 26)  SpO2: 95% (95% - 100%)  CVP(mm Hg): 3 (0 - 19)  CO: 5 (4.1 - 7.6)  CI: 2.8 (2.3 - 4.2)          07 Feb 2017 02:18    148    |  113    |  22.0   ----------------------------<  99     4.4     |  23.0   |  0.98     Ca    8.0        07 Feb 2017 02:18  Mg     2.4       07 Feb 2017 02:18    TPro  5.4    /  Alb  3.8    /  TBili  1.5    /  DBili  x      /  AST  73     /  ALT  56     /  AlkPhos  64     07 Feb 2017 02:18                               10.4   11.96 )-----------( 117      ( 07 Feb 2017 02:18 )             32.9        PT/INR - ( 07 Feb 2017 02:18 )   PT: 14.1 sec;   INR: 1.28 ratio         PTT - ( 07 Feb 2017 02:18 )  PTT:33.8 sec  ABG - ( 07 Feb 2017 05:31 )  pH: 7.38  /  pCO2: 39    /  pO2: 124   / HCO3: 23    / Base Excess: -1.3  /  SaO2: 99                   CARDIAC MARKERS ( 07 Feb 2017 02:18 )   U/L / CKMB40.6 ng/mL / Troponin T1.00 ng/mL /        CAPILLARY BLOOD GLUCOSE  142 (07 Feb 2017 22:00)  120 (07 Feb 2017 16:00)  115 (07 Feb 2017 12:00)  114 (07 Feb 2017 08:30)  96 (07 Feb 2017 07:00)  91 (07 Feb 2017 06:00)  99 (07 Feb 2017 05:00)  108 (07 Feb 2017 04:00)  105 (07 Feb 2017 03:00)  91 (07 Feb 2017 02:00)  110 (07 Feb 2017 01:00)           CXR: Status post extubation. Mild congestive changes.    I&O's Detail  I & Os for 24h ending 07 Feb 2017 07:00  =============================================  IN:    Lactated Ringers IV Bolus: 1250 ml    Albumin 5%  - 250 mL: 1000 ml    Oral Fluid: 920 ml    Solution: 400 ml    Solution: 300 ml    EPINEPHrine Infusion: 257 ml    norepinephrine Infusion: 214 ml    sodium chloride 0.9%.: 160 ml    Solution: 150 ml    DOBUTamine Infusion: 147 ml    Solution: 100 ml    Solution: 100 ml    milrinone  Infusion: 96.6 ml    sodium chloride 0.9%.: 80 ml    insulin Infusion: 51.5 ml    Solution: 50 ml    propofol Infusion: 38 ml    Total IN: 5314.1 ml  ---------------------------------------------  OUT:    Indwelling Catheter - Urethral: 1795 ml    Chest Tube: 510 ml    Chest Tube: 450 ml    Total OUT: 2755 ml  ---------------------------------------------  Total NET: 2559.1 ml    I & Os for current day (as of 08 Feb 2017 00:26)  =============================================  IN:    Oral Fluid: 1080 ml    Solution: 500 ml    sodium chloride 0.9%.: 170 ml    DOBUTamine Infusion: 168.1 ml    Solution: 100 ml    milrinone  Infusion: 90.1 ml    sodium chloride 0.9%.: 85 ml    norepinephrine Infusion: 14 ml    EPINEPHrine Infusion: 8 ml    Total IN: 2215.2 ml  ---------------------------------------------  OUT:    Indwelling Catheter - Urethral: 1040 ml    Chest Tube: 250 ml    Chest Tube: 240 ml    Total OUT: 1530 ml  ---------------------------------------------  Total NET: 685.2 ml      MEDICATIONS  (STANDING):  sodium chloride 0.9%. 15cc  docusate sodium 100milliGRAM(s) Oral three times a day  DOBUTamine Infusion 5MICROgram(s)/kG/Min IV Continuous <Continuous>  milrinone Infusion 0.375MICROgram(s)/kG/Min IV Continuous <Continuous>  amiodarone    Tablet 400milliGRAM(s) Oral every 8 hours  cefuroxime  IVPB 1500milliGRAM(s) IV Intermittent every 8 hours  vancomycin  IVPB 1000milliGRAM(s) IV Intermittent every 12 hours  enoxaparin Injectable 40milliGRAM(s) SubCutaneous daily  aspirin enteric coated 325milliGRAM(s) Oral daily  pantoprazole    Tablet 40milliGRAM(s) Oral before breakfast  insulin lispro (HumaLOG) corrective regimen sliding scale  SubCutaneous Before meals and at bedtime      Physical Exam  Neuro: A+O x 3, non-focal, speech clear and intact  Pulm: CTA, equal bilaterally  CV: RRR, +S1S2  Abd: soft, NT, ND, +BS  Ext: ROTHMAN x 4, no edema  Inc: MANDY C/D/I/stable w/ stacey,   -----------------------------------------------------------------------------------------------------------------------------------------------------------------------------

## 2017-02-09 LAB
ALBUMIN SERPL ELPH-MCNC: 3.3 G/DL — SIGNIFICANT CHANGE UP (ref 3.3–5.2)
ALP SERPL-CCNC: 81 U/L — SIGNIFICANT CHANGE UP (ref 40–120)
ALT FLD-CCNC: 41 U/L — HIGH
ANION GAP SERPL CALC-SCNC: 13 MMOL/L — SIGNIFICANT CHANGE UP (ref 5–17)
ANION GAP SERPL CALC-SCNC: 14 MMOL/L — SIGNIFICANT CHANGE UP (ref 5–17)
AST SERPL-CCNC: 26 U/L — SIGNIFICANT CHANGE UP
BILIRUB DIRECT SERPL-MCNC: 0.4 MG/DL — HIGH (ref 0–0.3)
BILIRUB INDIRECT FLD-MCNC: 0.6 MG/DL — SIGNIFICANT CHANGE UP (ref 0.2–1)
BILIRUB SERPL-MCNC: 1 MG/DL — SIGNIFICANT CHANGE UP (ref 0.4–2)
BUN SERPL-MCNC: 22 MG/DL — HIGH (ref 8–20)
BUN SERPL-MCNC: 24 MG/DL — HIGH (ref 8–20)
CALCIUM SERPL-MCNC: 8.1 MG/DL — LOW (ref 8.6–10.2)
CALCIUM SERPL-MCNC: 8.5 MG/DL — LOW (ref 8.6–10.2)
CHLORIDE SERPL-SCNC: 93 MMOL/L — LOW (ref 98–107)
CHLORIDE SERPL-SCNC: 97 MMOL/L — LOW (ref 98–107)
CO2 SERPL-SCNC: 23 MMOL/L — SIGNIFICANT CHANGE UP (ref 22–29)
CO2 SERPL-SCNC: 24 MMOL/L — SIGNIFICANT CHANGE UP (ref 22–29)
CREAT SERPL-MCNC: 0.82 MG/DL — SIGNIFICANT CHANGE UP (ref 0.5–1.3)
CREAT SERPL-MCNC: 0.95 MG/DL — SIGNIFICANT CHANGE UP (ref 0.5–1.3)
GLUCOSE SERPL-MCNC: 115 MG/DL — SIGNIFICANT CHANGE UP (ref 70–115)
GLUCOSE SERPL-MCNC: 144 MG/DL — HIGH (ref 70–115)
HCT VFR BLD CALC: 29.9 % — LOW (ref 42–52)
HGB BLD-MCNC: 9.5 G/DL — LOW (ref 14–18)
INR BLD: 2.34 RATIO — HIGH (ref 0.88–1.16)
INR BLD: 3.03 RATIO — HIGH (ref 0.88–1.16)
MAGNESIUM SERPL-MCNC: 2.1 MG/DL — SIGNIFICANT CHANGE UP (ref 1.8–2.5)
MAGNESIUM SERPL-MCNC: 2.2 MG/DL — SIGNIFICANT CHANGE UP (ref 1.8–2.5)
MCHC RBC-ENTMCNC: 28.4 PG — SIGNIFICANT CHANGE UP (ref 27–31)
MCHC RBC-ENTMCNC: 31.8 G/DL — LOW (ref 32–36)
MCV RBC AUTO: 89.5 FL — SIGNIFICANT CHANGE UP (ref 80–94)
PLATELET # BLD AUTO: 157 K/UL — SIGNIFICANT CHANGE UP (ref 150–400)
POTASSIUM SERPL-MCNC: 4.3 MMOL/L — SIGNIFICANT CHANGE UP (ref 3.5–5.3)
POTASSIUM SERPL-MCNC: 4.4 MMOL/L — SIGNIFICANT CHANGE UP (ref 3.5–5.3)
POTASSIUM SERPL-SCNC: 4.3 MMOL/L — SIGNIFICANT CHANGE UP (ref 3.5–5.3)
POTASSIUM SERPL-SCNC: 4.4 MMOL/L — SIGNIFICANT CHANGE UP (ref 3.5–5.3)
PROT SERPL-MCNC: 6 G/DL — LOW (ref 6.6–8.7)
PROTHROM AB SERPL-ACNC: 26 SEC — HIGH (ref 10–13.1)
PROTHROM AB SERPL-ACNC: 33.7 SEC — HIGH (ref 10–13.1)
RBC # BLD: 3.34 M/UL — LOW (ref 4.6–6.2)
RBC # FLD: 15.1 % — SIGNIFICANT CHANGE UP (ref 11–15.6)
SODIUM SERPL-SCNC: 130 MMOL/L — LOW (ref 135–145)
SODIUM SERPL-SCNC: 134 MMOL/L — LOW (ref 135–145)
WBC # BLD: 12.38 K/UL — HIGH (ref 4.8–10.8)
WBC # FLD AUTO: 12.38 K/UL — HIGH (ref 4.8–10.8)

## 2017-02-09 PROCEDURE — 99223 1ST HOSP IP/OBS HIGH 75: CPT

## 2017-02-09 PROCEDURE — 71010: CPT | Mod: 26

## 2017-02-09 PROCEDURE — 93010 ELECTROCARDIOGRAM REPORT: CPT

## 2017-02-09 RX ORDER — PHYTONADIONE (VIT K1) 5 MG
2.5 TABLET ORAL ONCE
Qty: 0 | Refills: 0 | Status: COMPLETED | OUTPATIENT
Start: 2017-02-09 | End: 2017-02-09

## 2017-02-09 RX ORDER — SPIRONOLACTONE 25 MG/1
25 TABLET, FILM COATED ORAL DAILY
Qty: 0 | Refills: 0 | Status: DISCONTINUED | OUTPATIENT
Start: 2017-02-10 | End: 2017-02-13

## 2017-02-09 RX ADMIN — SODIUM CHLORIDE 3 MILLILITER(S): 9 INJECTION INTRAMUSCULAR; INTRAVENOUS; SUBCUTANEOUS at 21:24

## 2017-02-09 RX ADMIN — Medication 2.5 MILLIGRAM(S): at 17:03

## 2017-02-09 RX ADMIN — Medication 100 MILLIGRAM(S): at 12:21

## 2017-02-09 RX ADMIN — AMIODARONE HYDROCHLORIDE 400 MILLIGRAM(S): 400 TABLET ORAL at 05:16

## 2017-02-09 RX ADMIN — TAMSULOSIN HYDROCHLORIDE 0.4 MILLIGRAM(S): 0.4 CAPSULE ORAL at 21:30

## 2017-02-09 RX ADMIN — Medication 100 MILLIGRAM(S): at 21:30

## 2017-02-09 RX ADMIN — Medication 100 MILLIGRAM(S): at 05:16

## 2017-02-09 RX ADMIN — Medication 325 MILLIGRAM(S): at 12:21

## 2017-02-09 RX ADMIN — PANTOPRAZOLE SODIUM 40 MILLIGRAM(S): 20 TABLET, DELAYED RELEASE ORAL at 05:16

## 2017-02-09 RX ADMIN — Medication 40 MILLIGRAM(S): at 05:16

## 2017-02-09 RX ADMIN — MILRINONE LACTATE 4.22 MICROGRAM(S)/KG/MIN: 1 INJECTION, SOLUTION INTRAVENOUS at 12:21

## 2017-02-09 RX ADMIN — SODIUM CHLORIDE 3 MILLILITER(S): 9 INJECTION INTRAMUSCULAR; INTRAVENOUS; SUBCUTANEOUS at 12:27

## 2017-02-09 RX ADMIN — SODIUM CHLORIDE 3 MILLILITER(S): 9 INJECTION INTRAMUSCULAR; INTRAVENOUS; SUBCUTANEOUS at 05:19

## 2017-02-09 NOTE — CONSULT NOTE ADULT - ATTENDING COMMENTS
As above, pt now POD #3 s/p AVR/TVR, with preop severe LV dysfunction and new diagnosis on nonischemic cardiomyopathy. Though on POD 1 ECG revealed sinus rhythm with 1st degree AV block, he has since been in complete heart block with junctional escape, which he has hemodynamically tolerated. On telemetry also noted to have episodes of NSVT up to 10 beats. If no improvement in conduction, he will require pacemaker implant prior to discharge, and in that case given severe LV dysfunction, primary prevention CRT-defibrillator would be reasonable (or CRT delivered by His bundle pacing to preserve ventricular synchrony).  He has been started on coumadin for post- AVR prophylaxis. Episode of AF reported in chart, but not seen on my review. He has had a very quick response to coumadin with INR now 2.3- will need to assure INR stable prior to device implant, though reasonable to proceed if INR <3 and stable. Will also defer until he is off inotropic medication, if possible.  If conduction does recover and pacemaker not indicated, would discharge with LifeVest and close followup, and consideration of cardiac device implant in 3 months pending reassessment of LV function.

## 2017-02-09 NOTE — PROGRESS NOTE ADULT - SUBJECTIVE AND OBJECTIVE BOX
Subjective: "I'm ok" Pt seen and examined with telephone #[phone] in [place]ian. Lying in bed NAD    VITAL SIGNS  Vital Signs Last 24 Hrs  T(C): 36.6, Max: 36.6 (-08 @ 15:30)  T(F): 97.8, Max: 97.8 (02-08 @ 15:30)  HR: 56 (56 - 68)  BP: 114/64 (106/68 - 138/87)  RR: 16 (12 - 25)  SpO2: 95% (95% - 98%) 2 LNC           Telemetry:  CHB 55-63, NSVT x 7 beats overnight  LVEF: 20-30%    MEDICATIONS  sodium chloride 0.9%. 1000milliLiter(s) IV Continuous <Continuous>  sodium chloride 0.9%. 1000milliLiter(s) IV Continuous <Continuous>  docusate sodium 100milliGRAM(s) Oral three times a day  milrinone Infusion 0.2MICROgram(s)/kG/Min IV Continuous <Continuous>  aspirin enteric coated 325milliGRAM(s) Oral daily  pantoprazole    Tablet 40milliGRAM(s) Oral before breakfast  insulin lispro (HumaLOG) corrective regimen sliding scale  SubCutaneous Before meals and at bedtime  furosemide    Tablet 40milliGRAM(s) Oral daily  sodium chloride 0.9% lock flush 3milliLiter(s) IV Push every 8 hours  oxyCODONE  5 mG/acetaminophen 325 mG 2Tablet(s) Oral every 6 hours PRN  oxyCODONE  5 mG/acetaminophen 325 mG 1Tablet(s) Oral every 4 hours PRN  tamsulosin 0.4milliGRAM(s) Oral at bedtime      PHYSICAL EXAM  General: well nourished, well developed, no acute distress  Neurology: alert and oriented x 3, nonfocal, no gross deficits  Respiratory: diminished bases L>R, otherwise clear  CV: regular rate and rhythm, normal S1, S2  Abdomen: soft, nontender, nondistended, positive bowel sounds, last bowel movement 2/8  Extremities: warm, well perfused. trace shins/ +1 pedal edema B/L. + DP pulses  Incisions: midline sternal incision, + mepilex, c/d/i. sternum stable.  Chest tubes: R pleural CT   Epicardial Wires: +V wire   > EPM VVI 45, VMA 6 (threshold 3), sens 2     I & Os for 24h ending  @ 07:00  =============================================  IN: 554.6 ml / OUT: 1015 ml / NET: -460.4 ml    I & Os for current day (as of  @ 11:49)  =============================================  IN: 4.2 ml / OUT: 700 ml / NET: -695.8 ml      Weights:  Daily   Daily Weight in k.8 (2017 06:11)  Admit Wt: Drug Dosing Weight  Height (cm): 167 (2017 07:40)  Weight (kg): 76.8 (2017 07:21)  BMI (kg/m2): 27.5 (2017 07:21)  BSA (m2): 1.86 (2017 07:21)    LABS  2017 06:46    134    |  97     |  22.0   ----------------------------<  115    4.4     |  24.0   |  0.82     Ca    8.1        2017 06:46  Mg     2.2       2017 06:46    TPro  6.0    /  Alb  3.3    /  TBili  1.0    /  DBili  0.4    /  AST  26     /  ALT  41     /  AlkPhos  81     2017 06:46                                 9.5    12.38 )-----------( 157      ( 2017 06:46 )             29.9          PT/INR - ( 2017 06:47 )   PT: 26.0 sec;   INR: 2.34 ratio      Bilirubin Total, Serum: 1.0 mg/dL ( @ 06:46)  Bilirubin Direct, Serum: 0.4 mg/dL ( @ 06:46)    CAPILLARY BLOOD GLUCOSE  109 (2017 08:00)  145 (2017 22:16)  130 (2017 16:00)  138 (2017 12:00)           Today's CXR: basilar atel    Today's EKG: CHB     PAST MEDICAL & SURGICAL HISTORY:  Mitral valve insufficiency, unspecified etiology  Aortic valve insufficiency, unspecified etiology  HFrEF (heart failure with reduced ejection fraction)  No significant past surgical history       ASSESSMENT    61y Male was admitted on  from [facility].     Preop course: Pt presented to [facility] on  for rapidly progressing SOB and occasional chest pain, (CCS class 3 anginal equivalent, NYHA class 3 heart failure). He states BLE edema, > 3 pillow orthopnea, palpitations, and claudication.  He had SOB and chest pain while in [place] and went to a doctor who gave him a medication that he does not recall the name of.  The illness progressed and [place] the [place] he went to [facility]. His echo demonstrated a global hypokinesis and it showed an EF of 15-20%. He was transferred to [facility] on  per son's request for cardiac cath which demonstrated normal coronaries. Pt noted to have acute sysolic heart failure secondary to reduced EF 20% and moderate-severe AI, bucuspid AV and severe MR noted on JARVIS with 4.7 cm ascending aortic aneurysm.    On , pt underwent Aortic valve replacement (T), Replacement of mitral valve (T), Ascending aortic aneurysm repair.    Postoperative Course/Issues: Prolonged inotropic requirement due to low EF, reported AF> given IV Amio bolus and PO load, now appears to be in 3rd degree HB with narrow QRS. Pt also has R Chest tube with persistent drainage.     PLAN  Neuro: Pain management with Percocet prn  Pulm: Encourage coughing, deep breathing and use of incentive spirometry. Wean off supplemental oxygen as able. Daily CXR.   Cardio: Continue Aspirin. Primacor weaning to 0.2 mcg/kg/min today, then off at 5 am tomorrow. Called Dr Thomas from EP per Dr Carter and plan will be to stop amiodarone, maintain backup epicardial pacemaker and possible Bi-V AICD Monday if heart block remains. If it is resolved, pt will most likely require lifevest. Continue Coumadin dosing for MVR (T) (usually will require for 3 months). Surveillance Echo - will possibly need another prior to discharge to evaluate EF.   GI: Protonix for GI prophylaxis. Tolerating diet. Continue stool softeners. + BM yesterday per patient.  Renal: Keep negative fluid balance. Started on daily Lasix today - will trend potassium levels. Trend BUN/Cr. Supplement electrolytes prn.   : Voiding without issue. On Flomax  Vasc: Lovenox stopped>therapeutic INR  Heme: Stable H/H. Trend CBC daily. Plts normalized. INR jumped to 2.3 from 1.3> will repeat levels this afternoon, may require vitamin K. Liver enzymes normal.  Endo: Glucoses reasonable 109-145. Hgb A1C 5.2 > d/c sliding scale coverage.  ID: Off antibiotics. Stable.  Therapy: PT daily as tolerated.   Tubes/Wires: Right chest tube still draining (250/24hr) - maintain for now, consider colchicine or motrin if not improved by tomorrow.  Disposition: Aim to D/C to home on Monday/Tuesday   Discussed with Cardiothoracic Team, Dr. Carter at AM rounds.

## 2017-02-09 NOTE — CONSULT NOTE ADULT - SUBJECTIVE AND OBJECTIVE BOX
is a 62y/o male former smoker with no significant past medical history who went to [facility] for rapidly progressing SOB and occasional chest pain, (CCS class 3 anginal equivalent, NYHA class 3 heart failure). He states BLE edema, > 3 pillow orthopnea, palpitations, and claudication.  He had SOB and chest pain while in [place] and went to a doctor who gave him a medication that he does not recall the name of.  The illness progressed and [place] to [facility]. His echo demonstrated a global hypokinesis and it showed an EF of 15-20%. He had a cath which demonstrated normal coronaries and he was admitted for acute systolic heart failure due to aortic insufficiency and reduced ejection fraction. Patient is now s/p AVR(t), MVR(t) and ascending aorta repair 2/6/17 noted on telemetry monitoring to have complete heart block. He remains on milrinone w/ chest tube in place.     PAST MEDICAL & SURGICAL HISTORY:  Mitral valve insufficiency, unspecified etiology  Aortic valve insufficiency, unspecified etiology  HFrEF (heart failure with reduced ejection fraction)      REVIEW OF SYSTEMS:  CONSTITUTIONAL: No fever, weight loss, or fatigue  EYES: No eye pain, visual disturbances, or discharge  ENMT:  No difficulty hearing, tinnitus, vertigo; No sinus or throat pain  NECK: No pain or stiffness  BREASTS: No pain, masses, or nipple discharge  RESPIRATORY: No cough, wheezing, chills or hemoptysis; No shortness of breath  CARDIOVASCULAR: No chest pain, palpitations, dizziness, or leg swelling  GASTROINTESTINAL: No abdominal or epigastric pain. No nausea, vomiting, or hematemesis; No diarrhea or constipation. No melena or hematochezia.  GENITOURINARY: No dysuria, frequency, hematuria, or incontinence  NEUROLOGICAL: No headaches, memory loss, loss of strength, numbness, or tremors  SKIN: No itching, burning, rashes, or lesions   LYMPH NODES: No enlarged glands  ENDOCRINE: No heat or cold intolerance; No hair loss  MUSCULOSKELETAL: No joint pain or swelling; No muscle, back, or extremity pain  PSYCHIATRIC: No depression, anxiety, mood swings, or difficulty sleeping  HEME/LYMPH: No easy bruising, or bleeding gums  ALLERY AND IMMUNOLOGIC: No hives or eczema      MEDICATIONS  (STANDING):  sodium chloride 0.9%. 1000milliLiter(s) IV Continuous <Continuous>  sodium chloride 0.9%. 1000milliLiter(s) IV Continuous <Continuous>  docusate sodium 100milliGRAM(s) Oral three times a day  milrinone Infusion 0.2MICROgram(s)/kG/Min IV Continuous <Continuous>  aspirin enteric coated 325milliGRAM(s) Oral daily  pantoprazole    Tablet 40milliGRAM(s) Oral before breakfast  insulin lispro (HumaLOG) corrective regimen sliding scale  SubCutaneous Before meals and at bedtime  furosemide    Tablet 40milliGRAM(s) Oral daily  sodium chloride 0.9% lock flush 3milliLiter(s) IV Push every 8 hours  tamsulosin 0.4milliGRAM(s) Oral at bedtime    MEDICATIONS  (PRN):  oxyCODONE  5 mG/acetaminophen 325 mG 2Tablet(s) Oral every 6 hours PRN Severe Pain (7 - 10)  oxyCODONE  5 mG/acetaminophen 325 mG 1Tablet(s) Oral every 4 hours PRN Moderate Pain (4 - 6)      Allergies  No Known Allergies      FAMILY HISTORY:  No pertinent family history in first degree relatives      Vital Signs Last 24 Hrs  T(C): 36.6, Max: 36.6 (02-08 @ 15:30)  T(F): 97.8, Max: 97.8 (02-08 @ 15:30)  HR: 56 (56 - 68)  BP: 114/64 (106/68 - 138/87)  BP(mean): 108 (106 - 108)  RR: 16 (12 - 25)  SpO2: 95% (95% - 98%)    Physical Exam:  Constitutional:  Neck:   Cardiovascular:   Pulmonary:   Abdomen:   Extremities:   Neuro:     LABS:                        9.5    12.38 )-----------( 157      ( 09 Feb 2017 06:46 )             29.9   09 Feb 2017 06:46    134    |  97     |  22.0   ----------------------------<  115    4.4     |  24.0   |  0.82     Ca    8.1        09 Feb 2017 06:46  Mg     2.2       09 Feb 2017 06:46    TPro  6.0    /  Alb  3.3    /  TBili  1.0    /  DBili  0.4    /  AST  26     /  ALT  41     /  AlkPhos  81     09 Feb 2017 06:46  LIVER FUNCTIONS - ( 09 Feb 2017 06:46 )  Alb: 3.3 g/dL / Pro: 6.0 g/dL / ALK PHOS: 81 U/L / ALT: 41 U/L / AST: 26 U/L / GGT: x           PT/INR - ( 09 Feb 2017 06:47 )   PT: 26.0 sec;   INR: 2.34 ratio         Assessment:   60 y/o male w/ acute class III HFrEF in the setting of NICM, LVEF 15-20%, bicuspid aortic valve w/ mod  to severe AI, severe MV regurg, and AAA; now POD #3 s/p bio AVR, bio MVR and AAA repair with complete heart block. Notably, pt has had dramatic increase in INR w/ 2 doses of coumadin, has a chest tube in place and remains on milrinone support.     Plan:  Pt has Class Ia indication for permanent pacemaker; cardiac resynchronization therapy with BiV pacing is indicated given anticipated high ventricular pacing burden and HFrEF. Additionally, given current need for PPM w/ LVEF 15-20%, a primary prevention ICD is also indicated.   Once response to coumadin is clarified/ stabilized, chest tube is d/c'd x 24 hours (w/ pt afebrile & stable WBC), and pt is weaned off milrinone, would recommend BiV ICD implant.  is a 62y/o male former smoker with no significant past medical history who went to [facility] for rapidly progressing SOB and occasional chest pain, (CCS class 3 anginal equivalent, NYHA class 3 heart failure). He states BLE edema, > 3 pillow orthopnea, palpitations, and claudication.  He had SOB and chest pain while in [place] and went to a doctor who gave him a medication that he does not recall the name of.  The illness progressed and [place] the [place] he went to [facility]. His echo demonstrated a global hypokinesis and it showed an EF of 15-20%. He had a cath which demonstrated normal coronaries and he was admitted for acute systolic heart failure due to aortic insufficiency and reduced ejection fraction. Patient is now s/p AVR(t), MVR(t) and ascending aorta repair 2/6/17 noted on telemetry monitoring to have complete heart block. He remains on milrinone w/ chest tube in place.     PAST MEDICAL & SURGICAL HISTORY:  Mitral valve insufficiency, unspecified etiology  Aortic valve insufficiency, unspecified etiology  HFrEF (heart failure with reduced ejection fraction)      REVIEW OF SYSTEMS:  CONSTITUTIONAL: No fever, weight loss, or fatigue  EYES: No eye pain, visual disturbances, or discharge  ENMT:  No difficulty hearing, tinnitus, vertigo; No sinus or throat pain  NECK: No pain or stiffness  BREASTS: No pain, masses, or nipple discharge  RESPIRATORY: No cough, wheezing, chills or hemoptysis; No shortness of breath  CARDIOVASCULAR: No chest pain, palpitations, dizziness, or leg swelling  GASTROINTESTINAL: No abdominal or epigastric pain. No nausea, vomiting, or hematemesis; No diarrhea or constipation. No melena or hematochezia.  GENITOURINARY: No dysuria, frequency, hematuria, or incontinence  NEUROLOGICAL: No headaches, memory loss, loss of strength, numbness, or tremors  SKIN: No itching, burning, rashes, or lesions   LYMPH NODES: No enlarged glands  ENDOCRINE: No heat or cold intolerance; No hair loss  MUSCULOSKELETAL: No joint pain or swelling; No muscle, back, or extremity pain  PSYCHIATRIC: No depression, anxiety, mood swings, or difficulty sleeping  HEME/LYMPH: No easy bruising, or bleeding gums  ALLERY AND IMMUNOLOGIC: No hives or eczema      MEDICATIONS  (STANDING):  sodium chloride 0.9%. 1000milliLiter(s) IV Continuous <Continuous>  sodium chloride 0.9%. 1000milliLiter(s) IV Continuous <Continuous>  docusate sodium 100milliGRAM(s) Oral three times a day  milrinone Infusion 0.2MICROgram(s)/kG/Min IV Continuous <Continuous>  aspirin enteric coated 325milliGRAM(s) Oral daily  pantoprazole    Tablet 40milliGRAM(s) Oral before breakfast  insulin lispro (HumaLOG) corrective regimen sliding scale  SubCutaneous Before meals and at bedtime  furosemide    Tablet 40milliGRAM(s) Oral daily  sodium chloride 0.9% lock flush 3milliLiter(s) IV Push every 8 hours  tamsulosin 0.4milliGRAM(s) Oral at bedtime    MEDICATIONS  (PRN):  oxyCODONE  5 mG/acetaminophen 325 mG 2Tablet(s) Oral every 6 hours PRN Severe Pain (7 - 10)  oxyCODONE  5 mG/acetaminophen 325 mG 1Tablet(s) Oral every 4 hours PRN Moderate Pain (4 - 6)      Allergies  No Known Allergies      FAMILY HISTORY:  No pertinent family history in first degree relatives      Vital Signs Last 24 Hrs  T(C): 36.6, Max: 36.6 (02-08 @ 15:30)  T(F): 97.8, Max: 97.8 (02-08 @ 15:30)  HR: 56 (56 - 68)  BP: 114/64 (106/68 - 138/87)  BP(mean): 108 (106 - 108)  RR: 16 (12 - 25)  SpO2: 95% (95% - 98%)    Physical Exam:  Constitutional:  Neck:   Cardiovascular:   Pulmonary:   Abdomen:   Extremities:   Neuro:     LABS:                        9.5    12.38 )-----------( 157      ( 09 Feb 2017 06:46 )             29.9   09 Feb 2017 06:46    134    |  97     |  22.0   ----------------------------<  115    4.4     |  24.0   |  0.82     Ca    8.1        09 Feb 2017 06:46  Mg     2.2       09 Feb 2017 06:46    TPro  6.0    /  Alb  3.3    /  TBili  1.0    /  DBili  0.4    /  AST  26     /  ALT  41     /  AlkPhos  81     09 Feb 2017 06:46  LIVER FUNCTIONS - ( 09 Feb 2017 06:46 )  Alb: 3.3 g/dL / Pro: 6.0 g/dL / ALK PHOS: 81 U/L / ALT: 41 U/L / AST: 26 U/L / GGT: x           PT/INR - ( 09 Feb 2017 06:47 )   PT: 26.0 sec;   INR: 2.34 ratio      EXAM:  ECHO TRANSESOPHAGEAL    PROCEDURE DATE:  Feb  3 2017   .2. Severely decreased global left ventricular systolic function.   3. Left ventricular ejection fraction, by visual estimation, is <20%.   4. Elevated left atrial and left ventricular end-diastolic pressures.   5. Spectral Doppler shows restrictive pattern of left ventricular   myocardial filling (Grade III diastolic dysfunction).   6. No left atrial appendage thrombus.   7. Color flow doppler and intravenous injection of agitated saline   demonstrates the presence of an intact intra atrial septum.   8. Mildly dilated right atrium.   9. Moderately enlarged left atrium.  10. The anterior leaflet of mitral valve is redundant. There is severe   mitral valve regurgitation, somewhat related to aortic valve   regurgitation that hits the anterior leaflet (Kvng-Glenrock mechanism).  11. Bicuspid aortic valve with doming of the right coronary cusp.   Moderate to severe AI is present.  12. Partial sinotubular junction effacement. Carolyn aortic root at the   sinuses of Valsalva. Ascending aorta is 4.7 cm in diameter (measured at 4   cm from aortic valve annulus).  13. Moderate tricuspid regurgitation.  14. Estimated pulmonary artery systolic pressure is 43.2 mmHg assuming a   right atrial pressure of 5 mmHg, which is consistent with mild pulmonary   hypertension.  15. Trivial pericardial effusion.       Assessment:   60 y/o male w/ acute class III HFrEF in the setting of NICM, LVEF 15-20%, bicuspid aortic valve w/ mod  to severe AI, severe MV regurg, and AAA; now POD #3 s/p bio AVR, bio MVR and AAA repair with complete heart block. Notably, pt has had dramatic increase in INR w/ 2 doses of coumadin, has a chest tube in place and remains on milrinone support.     Plan:  Pt has Class Ia indication for permanent pacemaker; cardiac resynchronization therapy with BiV pacing is indicated given anticipated high ventricular pacing burden and HFrEF. Additionally, given current need for PPM w/ LVEF 15-20%, a primary prevention ICD is also indicated.   Once response to coumadin is clarified/ stabilized, chest tube is d/c'd x 24 hours (w/ pt afebrile & stable WBC), and pt is weaned off milrinone, would recommend BiV ICD implant.

## 2017-02-10 LAB
ANION GAP SERPL CALC-SCNC: 12 MMOL/L — SIGNIFICANT CHANGE UP (ref 5–17)
BUN SERPL-MCNC: 22 MG/DL — HIGH (ref 8–20)
CALCIUM SERPL-MCNC: 8.3 MG/DL — LOW (ref 8.6–10.2)
CHLORIDE SERPL-SCNC: 93 MMOL/L — LOW (ref 98–107)
CO2 SERPL-SCNC: 27 MMOL/L — SIGNIFICANT CHANGE UP (ref 22–29)
CREAT SERPL-MCNC: 0.73 MG/DL — SIGNIFICANT CHANGE UP (ref 0.5–1.3)
GLUCOSE SERPL-MCNC: 112 MG/DL — SIGNIFICANT CHANGE UP (ref 70–115)
HCT VFR BLD CALC: 31.2 % — LOW (ref 42–52)
HGB BLD-MCNC: 9.9 G/DL — LOW (ref 14–18)
INR BLD: 2.09 RATIO — HIGH (ref 0.88–1.16)
MAGNESIUM SERPL-MCNC: 1.9 MG/DL — SIGNIFICANT CHANGE UP (ref 1.8–2.5)
MCHC RBC-ENTMCNC: 28.3 PG — SIGNIFICANT CHANGE UP (ref 27–31)
MCHC RBC-ENTMCNC: 31.7 G/DL — LOW (ref 32–36)
MCV RBC AUTO: 89.1 FL — SIGNIFICANT CHANGE UP (ref 80–94)
PLATELET # BLD AUTO: 198 K/UL — SIGNIFICANT CHANGE UP (ref 150–400)
POTASSIUM SERPL-MCNC: 3.7 MMOL/L — SIGNIFICANT CHANGE UP (ref 3.5–5.3)
POTASSIUM SERPL-SCNC: 3.7 MMOL/L — SIGNIFICANT CHANGE UP (ref 3.5–5.3)
PROTHROM AB SERPL-ACNC: 23.2 SEC — HIGH (ref 10–13.1)
RBC # BLD: 3.5 M/UL — LOW (ref 4.6–6.2)
RBC # FLD: 15 % — SIGNIFICANT CHANGE UP (ref 11–15.6)
SODIUM SERPL-SCNC: 132 MMOL/L — LOW (ref 135–145)
SURGICAL PATHOLOGY FINAL REPORT - CH: SIGNIFICANT CHANGE UP
WBC # BLD: 9.35 K/UL — SIGNIFICANT CHANGE UP (ref 4.8–10.8)
WBC # FLD AUTO: 9.35 K/UL — SIGNIFICANT CHANGE UP (ref 4.8–10.8)

## 2017-02-10 PROCEDURE — 93010 ELECTROCARDIOGRAM REPORT: CPT

## 2017-02-10 PROCEDURE — 99233 SBSQ HOSP IP/OBS HIGH 50: CPT

## 2017-02-10 PROCEDURE — 71010: CPT | Mod: 26

## 2017-02-10 RX ORDER — LISINOPRIL 2.5 MG/1
2.5 TABLET ORAL DAILY
Qty: 0 | Refills: 0 | Status: DISCONTINUED | OUTPATIENT
Start: 2017-02-10 | End: 2017-02-13

## 2017-02-10 RX ORDER — COLCHICINE 0.6 MG
0.6 TABLET ORAL ONCE
Qty: 0 | Refills: 0 | Status: COMPLETED | OUTPATIENT
Start: 2017-02-10 | End: 2017-02-10

## 2017-02-10 RX ORDER — COLCHICINE 0.6 MG
0.6 TABLET ORAL ONCE
Qty: 0 | Refills: 0 | Status: COMPLETED | OUTPATIENT
Start: 2017-02-10 | End: 2017-02-11

## 2017-02-10 RX ORDER — MAGNESIUM SULFATE 500 MG/ML
2 VIAL (ML) INJECTION ONCE
Qty: 0 | Refills: 0 | Status: COMPLETED | OUTPATIENT
Start: 2017-02-10 | End: 2017-02-10

## 2017-02-10 RX ORDER — WARFARIN SODIUM 2.5 MG/1
2.5 TABLET ORAL ONCE
Qty: 0 | Refills: 0 | Status: COMPLETED | OUTPATIENT
Start: 2017-02-10 | End: 2017-02-10

## 2017-02-10 RX ORDER — POTASSIUM CHLORIDE 20 MEQ
40 PACKET (EA) ORAL EVERY 4 HOURS
Qty: 0 | Refills: 0 | Status: COMPLETED | OUTPATIENT
Start: 2017-02-10 | End: 2017-02-10

## 2017-02-10 RX ORDER — SORBITOL SOLUTION 70 %
30 SOLUTION, ORAL MISCELLANEOUS ONCE
Qty: 0 | Refills: 0 | Status: COMPLETED | OUTPATIENT
Start: 2017-02-10 | End: 2017-02-10

## 2017-02-10 RX ADMIN — Medication 40 MILLIEQUIVALENT(S): at 13:53

## 2017-02-10 RX ADMIN — SODIUM CHLORIDE 3 MILLILITER(S): 9 INJECTION INTRAMUSCULAR; INTRAVENOUS; SUBCUTANEOUS at 11:55

## 2017-02-10 RX ADMIN — Medication 0.6 MILLIGRAM(S): at 11:56

## 2017-02-10 RX ADMIN — WARFARIN SODIUM 2.5 MILLIGRAM(S): 2.5 TABLET ORAL at 09:24

## 2017-02-10 RX ADMIN — TAMSULOSIN HYDROCHLORIDE 0.4 MILLIGRAM(S): 0.4 CAPSULE ORAL at 21:23

## 2017-02-10 RX ADMIN — PANTOPRAZOLE SODIUM 40 MILLIGRAM(S): 20 TABLET, DELAYED RELEASE ORAL at 05:04

## 2017-02-10 RX ADMIN — Medication 40 MILLIEQUIVALENT(S): at 09:24

## 2017-02-10 RX ADMIN — Medication 30 MILLILITER(S): at 13:53

## 2017-02-10 RX ADMIN — Medication 40 MILLIGRAM(S): at 05:04

## 2017-02-10 RX ADMIN — Medication 50 GRAM(S): at 09:24

## 2017-02-10 RX ADMIN — Medication 325 MILLIGRAM(S): at 11:56

## 2017-02-10 RX ADMIN — Medication 100 MILLIGRAM(S): at 05:04

## 2017-02-10 RX ADMIN — SPIRONOLACTONE 25 MILLIGRAM(S): 25 TABLET, FILM COATED ORAL at 05:04

## 2017-02-10 RX ADMIN — Medication 100 MILLIGRAM(S): at 11:57

## 2017-02-10 RX ADMIN — SODIUM CHLORIDE 3 MILLILITER(S): 9 INJECTION INTRAMUSCULAR; INTRAVENOUS; SUBCUTANEOUS at 21:25

## 2017-02-10 RX ADMIN — SODIUM CHLORIDE 3 MILLILITER(S): 9 INJECTION INTRAMUSCULAR; INTRAVENOUS; SUBCUTANEOUS at 05:03

## 2017-02-10 NOTE — PROGRESS NOTE ADULT - SUBJECTIVE AND OBJECTIVE BOX
Subjective: Interviewed / pacific   Won # [id number]  Denies pain, explained need for indwelling pleural tube, possibility of PPM today  Tele:  Complete heart block  60-70  periods 3-4 beats VT previous 24  hrs  Vital Signs Last 24 Hrs  T(C): 37.1, Max: 37.1 (02-10 @ 04:55)  T(F): 98.7, Max: 98.7 (02-10 @ 04:55)  HR: 50 (50 - 71)  BP: 121/79 (117/75 - 132/62)  RR: 16 (16 - 17)  SpO2: 94% (93% - 99%)                  LV EF:> 20%  Labs:  10 Feb 2017 06:59    132    |  93     |  22.0   ----------------------------<  112    3.7     |  27.0   |  0.73     Ca    8.3        10 Feb 2017 06:59  Mg     1.9       10 Feb 2017 06:59    TPro  6.0    /  Alb  3.3    /  TBili  1.0    /  DBili  0.4    /  AST  26     /  ALT  41     /  AlkPhos  81     09 Feb 2017 06:46                             9.9    9.35  )-----------( 198      ( 10 Feb 2017 06:59 )             31.2       PT/INR - ( 10 Feb 2017 06:59 )   PT: 23.2 sec;   INR: 2.09 ratio         CXR:Bibasilar opacities left greater than right, stable  EKG: Complete heart block 70's    I & Os for 24h ending 02-10 @ 07:00  =============================================  IN: 1089.8 ml / OUT: 1240 ml / NET: -150.2 ml    I & Os for current day (as of 02-10 @ 10:22)  =============================================  IN: 50 ml / OUT: 780 ml / NET: -730 ml      CHEST TUBE:   L  pleural   [x ] YES [ ] NO  OUTPUT: serosang    150/150> 300 ml   per 24 hours    AIR LEAKS:  [ ] YES [x ] NO      KYLIE DRAIN:   [ ] YES [x ] NO  OUTPUT:     per 24 hours    EPICARDIAL WIRES:  [x ] YES [ ] NO      BOWEL MOVEMENT:  [ ] YES [x ] NO    2/9  reported    MEDICATIONS  (STANDING):  docusate sodium 100milliGRAM(s) Oral three times a day  aspirin enteric coated 325milliGRAM(s) Oral daily  pantoprazole    Tablet 40milliGRAM(s) Oral before breakfast  furosemide    Tablet 40milliGRAM(s) Oral daily  sodium chloride 0.9% lock flush 3milliLiter(s) IV Push every 8 hours  tamsulosin 0.4milliGRAM(s) Oral at bedtime  spironolactone 25milliGRAM(s) Oral daily  potassium chloride    Tablet ER 40milliEquivalent(s) Oral every 4 hours  colchicine 0.6milliGRAM(s) Oral once  sorbitol 70% Solution 30milliLiter(s) Oral once    MEDICATIONS  (PRN):  oxyCODONE  5 mG/acetaminophen 325 mG 2Tablet(s) Oral every 6 hours PRN Severe Pain (7 - 10)  oxyCODONE  5 mG/acetaminophen 325 mG 1Tablet(s) Oral every 4 hours PRN Moderate Pain (4 - 6)      Physical Exam:    Neuro: alert, no deficits    Pulm: essentially clear, room air>demonstrates good use of incentive spirometry    CV:  S1 S2  RRR    Abd: soft  non tender + bowel sounds    Extremities: trace B/L lower extrem eema. neg calf pain    Incision(s): mepilex dsg secured sternum stable Pacing wires to VVI  60/10            PAST MEDICAL & SURGICAL HISTORY:  Mitral valve insufficiency, unspecified etiology  Aortic valve insufficiency, unspecified etiology  HFrEF (heart failure with reduced ejection fraction)  No significant past surgical history      Assessment:   61y Male was admitted on 2/2 from [facility].       Preop course: Pt presented to [facility] on 1/21 for rapidly progressing SOB and occasional chest pain, (CCS class 3 anginal equivalent, NYHA class 3 heart failure). He states BLE edema, > 3 pillow orthopnea, palpitations, and claudication.  He had SOB and chest pain while in [place] and went to a doctor who gave him a medication that he does not recall the name of.  The illness progressed and [place] went to [facility]. His echo demonstrated a global hypokinesis and it showed an EF of 15-20%. He was transferred to [facility] on 2/2 per son's request for cardiac cath which demonstrated normal coronaries. Pt noted to have acute sysolic heart failure secondary to reduced EF 20% and moderate-severe AI, bucuspid AV and severe MR noted on JARVIS with 4.7 cm ascending aortic aneurysm.    2/6,S/P Aortic valve replacement (T), Replacement of mitral valve (T), Ascending aortic aneurysm repair.    Postoperative Course/Issues: Prolonged inotropic requirement due to low EF, reported AF> given IV Amio bolus and PO load,  3rd degree HB with narrow QRS.ventricular arrythmia     R Chest tube with persistent drainage., supratherapeutic INR> recd Vit K 2.5 mg po x 1  Echo > no effusion EF <  20%                 Plan:  Encourage coughing, deep breathing and use of incentive spirometry. Wean off supplemental oxygen as able   Continue Aspirin.  D/C Primacor  Dr Thomas from EP following for complete heart block/Ventric arrythmia  maintain backup epicardial pacemaker and possible Bi-V AICD today if schedule allows   Continue Coumadin dosing for MVR (T)   Replete potassium and magnesium   Protonix for GI prophylaxis.  Continue Lasix/ aldalctone fluid overload  BPH Flomax  Right chest tube still draining (300/24hr) - maintain for now,colchicine x 1 given  Discussed with Dr Sara thompson.

## 2017-02-10 NOTE — PROGRESS NOTE ADULT - ASSESSMENT
62 y/o male w/ POD 4 s.o AVR/MVR, with severe LV dysfunction. Initially postop he had sinus rhythm with 1st degree AV block, and subsequently went into apparent 3rd degree heart block with narrow QRS. Now with evidence of improvement in conduction, with type 1 second degree AV block, though still markedly prolonged AV delay with bradycardia.    -Given apparent improvement, would continue to monitor on telemetry through the weekend prior to committing to cardiac device implant  -If by monday significant heart block remains, will plan pacemaker implant. In that case, given severe LV dysfunction, may benefit from CRT-D implant  -If conduction continues to improve, would discharge with Life vest and outpatient followup for repeat assessment of LVEF

## 2017-02-10 NOTE — PROGRESS NOTE ADULT - SUBJECTIVE AND OBJECTIVE BOX
Pt feels well today.  Telemetry reveals evidence of type 1 second degree AV block, with narrow QRS. Pt denies LH. HRs remain >50 bpm.     MEDICATIONS  (STANDING):  docusate sodium 100milliGRAM(s) Oral three times a day  aspirin enteric coated 325milliGRAM(s) Oral daily  pantoprazole    Tablet 40milliGRAM(s) Oral before breakfast  furosemide    Tablet 40milliGRAM(s) Oral daily  sodium chloride 0.9% lock flush 3milliLiter(s) IV Push every 8 hours  tamsulosin 0.4milliGRAM(s) Oral at bedtime  spironolactone 25milliGRAM(s) Oral daily  colchicine 0.6milliGRAM(s) Oral once    MEDICATIONS  (PRN):  oxyCODONE  5 mG/acetaminophen 325 mG 2Tablet(s) Oral every 6 hours PRN Severe Pain (7 - 10)  oxyCODONE  5 mG/acetaminophen 325 mG 1Tablet(s) Oral every 4 hours PRN Moderate Pain (4 - 6)      Allergies    No Known Allergies    Intolerances      PAST MEDICAL & SURGICAL HISTORY:  Mitral valve insufficiency, unspecified etiology  Aortic valve insufficiency, unspecified etiology  HFrEF (heart failure with reduced ejection fraction)  No significant past surgical history      Vital Signs Last 24 Hrs  T(C): 36.8, Max: 37.4 (02-10 @ 12:00)  T(F): 98.3, Max: 99.3 (02-10 @ 12:00)  HR: 64 (50 - 64)  BP: 110/60 (110/60 - 121/79)  BP(mean): --  RR: 16 (16 - 17)  SpO2: 100% (94% - 100%)    Physical Exam:  Constitutional: NAD, AAOx3  Cardiovascular: +S1S2 irregular   Pulmonary: CTA b/l, unlabored. chest tube in place  GI: soft NTND +BS  Extremities: no pedal edema, +distal pulses b/l  Neuro: non focal, ROTHMAN x4    LABS:                        9.9    9.35  )-----------( 198      ( 10 Feb 2017 06:59 )             31.2     10 Feb 2017 06:59    132    |  93     |  22.0   ----------------------------<  112    3.7     |  27.0   |  0.73     Ca    8.3        10 Feb 2017 06:59  Mg     1.9       10 Feb 2017 06:59    TPro  6.0    /  Alb  3.3    /  TBili  1.0    /  DBili  0.4    /  AST  26     /  ALT  41     /  AlkPhos  81     09 Feb 2017 06:46    PT/INR - ( 10 Feb 2017 06:59 )   PT: 23.2 sec;   INR: 2.09 ratio         RADIOLOGY & ADDITIONAL TESTS:  TTE 2/9/17   1. Severely decreased global left ventricular systolic function. Left   ventricular ejection fraction, by visual estimation, is <20%.   2. RV systolic function is normal.   3. Normal appearing aortic and mitral prosthesis. Hemodynamic values as   described above   4. Biatrial enlargement   5. No pericardial effusion.   6. ** Compared to TTE dated 2/3/17, mitral and aortic prosthesis now   present. LV systolic slightly less.

## 2017-02-10 NOTE — PROGRESS NOTE ADULT - SUBJECTIVE AND OBJECTIVE BOX
INTERVAL HISTORY:  Feels better.  Gaining strength  No angina  Chest tube still in  	  MEDICATIONS:  furosemide    Tablet 40milliGRAM(s) Oral daily  tamsulosin 0.4milliGRAM(s) Oral at bedtime  spironolactone 25milliGRAM(s) Oral daily        oxyCODONE  5 mG/acetaminophen 325 mG 2Tablet(s) Oral every 6 hours PRN  oxyCODONE  5 mG/acetaminophen 325 mG 1Tablet(s) Oral every 4 hours PRN    docusate sodium 100milliGRAM(s) Oral three times a day  pantoprazole    Tablet 40milliGRAM(s) Oral before breakfast    colchicine 0.6milliGRAM(s) Oral once    aspirin enteric coated 325milliGRAM(s) Oral daily        PHYSICAL EXAM:    T(C): 36.8, Max: 37.4 (02-10 @ 12:00)  HR: 64 (50 - 64)  BP: 110/60 (110/60 - 121/79)  RR: 16 (16 - 17)  SpO2: 100% (94% - 100%)  Wt(kg): --    I&O's Summary  I & Os for 24h ending 10 Feb 2017 07:00  =============================================  IN: 1089.8 ml / OUT: 1240 ml / NET: -150.2 ml    I & Os for current day (as of 10 Feb 2017 20:53)  =============================================  IN: 170 ml / OUT: 1440 ml / NET: -1270 ml      Daily     Daily Weight in k (10 Feb 2017 05:00)    Appearance: Normal	  HEENT:   Normal oral mucosa, PERRL, EOMI	  Lymphatic: No lymphadenopathy  Cardiovascular: Normal S1 S2, No JVD, No murmurs, No edema  Respiratory: Lungs clear to auscultation	  Psychiatry: A & O x 3, Mood & affect appropriate  Gastrointestinal:  Soft, Non-tender, + BS	  Skin: No rashes, No ecchymoses, No cyanosis  Neurologic: Non-focal  Extremities: Normal range of motion, No clubbing, cyanosis or edema  Vascular: Peripheral pulses palpable 2+ bilaterally                          9.9    9.35  )-----------( 198      ( 10 Feb 2017 06:59 )             31.2     10 Feb 2017 06:59    132    |  93     |  22.0   ----------------------------<  112    3.7     |  27.0   |  0.73     Ca    8.3        10 Feb 2017 06:59  Mg     1.9       10 Feb 2017 06:59    TPro  6.0    /  Alb  3.3    /  TBili  1.0    /  DBili  0.4    /  AST  26     /  ALT  41     /  AlkPhos  81     2017 06:46    ASSESSMENT/PLAN: 	    Echo shows normal function of the mitral and aortic bioprosthesis and severely reduced EF  Will start on low dose of ACE inhibitor  No beta blockers given high degree AV block which is being monitored. Patient already seen by Dr Thomas.

## 2017-02-11 LAB
ALBUMIN SERPL ELPH-MCNC: 3.4 G/DL — SIGNIFICANT CHANGE UP (ref 3.3–5.2)
ALP SERPL-CCNC: 165 U/L — HIGH (ref 40–120)
ALT FLD-CCNC: 58 U/L — HIGH
ANION GAP SERPL CALC-SCNC: 11 MMOL/L — SIGNIFICANT CHANGE UP (ref 5–17)
AST SERPL-CCNC: 52 U/L — HIGH
BILIRUB SERPL-MCNC: 0.8 MG/DL — SIGNIFICANT CHANGE UP (ref 0.4–2)
BUN SERPL-MCNC: 21 MG/DL — HIGH (ref 8–20)
CALCIUM SERPL-MCNC: 8.4 MG/DL — LOW (ref 8.6–10.2)
CHLORIDE SERPL-SCNC: 96 MMOL/L — LOW (ref 98–107)
CO2 SERPL-SCNC: 27 MMOL/L — SIGNIFICANT CHANGE UP (ref 22–29)
CREAT SERPL-MCNC: 0.72 MG/DL — SIGNIFICANT CHANGE UP (ref 0.5–1.3)
GLUCOSE SERPL-MCNC: 93 MG/DL — SIGNIFICANT CHANGE UP (ref 70–115)
HCT VFR BLD CALC: 31.2 % — LOW (ref 42–52)
HGB BLD-MCNC: 10 G/DL — LOW (ref 14–18)
INR BLD: 2.1 RATIO — HIGH (ref 0.88–1.16)
MAGNESIUM SERPL-MCNC: 2.1 MG/DL — SIGNIFICANT CHANGE UP (ref 1.8–2.5)
MCHC RBC-ENTMCNC: 28.7 PG — SIGNIFICANT CHANGE UP (ref 27–31)
MCHC RBC-ENTMCNC: 32.1 G/DL — SIGNIFICANT CHANGE UP (ref 32–36)
MCV RBC AUTO: 89.7 FL — SIGNIFICANT CHANGE UP (ref 80–94)
PLATELET # BLD AUTO: 247 K/UL — SIGNIFICANT CHANGE UP (ref 150–400)
POTASSIUM SERPL-MCNC: 4.1 MMOL/L — SIGNIFICANT CHANGE UP (ref 3.5–5.3)
POTASSIUM SERPL-SCNC: 4.1 MMOL/L — SIGNIFICANT CHANGE UP (ref 3.5–5.3)
PROT SERPL-MCNC: 6.3 G/DL — LOW (ref 6.6–8.7)
PROTHROM AB SERPL-ACNC: 23.3 SEC — HIGH (ref 10–13.1)
RBC # BLD: 3.48 M/UL — LOW (ref 4.6–6.2)
RBC # FLD: 15 % — SIGNIFICANT CHANGE UP (ref 11–15.6)
SODIUM SERPL-SCNC: 134 MMOL/L — LOW (ref 135–145)
WBC # BLD: 7.98 K/UL — SIGNIFICANT CHANGE UP (ref 4.8–10.8)
WBC # FLD AUTO: 7.98 K/UL — SIGNIFICANT CHANGE UP (ref 4.8–10.8)

## 2017-02-11 PROCEDURE — 93010 ELECTROCARDIOGRAM REPORT: CPT

## 2017-02-11 PROCEDURE — 71010: CPT | Mod: 26

## 2017-02-11 RX ORDER — WARFARIN SODIUM 2.5 MG/1
1 TABLET ORAL ONCE
Qty: 0 | Refills: 0 | Status: COMPLETED | OUTPATIENT
Start: 2017-02-11 | End: 2017-02-11

## 2017-02-11 RX ORDER — COLCHICINE 0.6 MG
0.6 TABLET ORAL
Qty: 0 | Refills: 0 | Status: DISCONTINUED | OUTPATIENT
Start: 2017-02-11 | End: 2017-02-13

## 2017-02-11 RX ADMIN — Medication 100 MILLIGRAM(S): at 05:37

## 2017-02-11 RX ADMIN — SODIUM CHLORIDE 3 MILLILITER(S): 9 INJECTION INTRAMUSCULAR; INTRAVENOUS; SUBCUTANEOUS at 05:36

## 2017-02-11 RX ADMIN — Medication 325 MILLIGRAM(S): at 11:21

## 2017-02-11 RX ADMIN — Medication 100 MILLIGRAM(S): at 13:17

## 2017-02-11 RX ADMIN — PANTOPRAZOLE SODIUM 40 MILLIGRAM(S): 20 TABLET, DELAYED RELEASE ORAL at 05:37

## 2017-02-11 RX ADMIN — LISINOPRIL 2.5 MILLIGRAM(S): 2.5 TABLET ORAL at 05:37

## 2017-02-11 RX ADMIN — SODIUM CHLORIDE 3 MILLILITER(S): 9 INJECTION INTRAMUSCULAR; INTRAVENOUS; SUBCUTANEOUS at 13:17

## 2017-02-11 RX ADMIN — WARFARIN SODIUM 1 MILLIGRAM(S): 2.5 TABLET ORAL at 21:13

## 2017-02-11 RX ADMIN — TAMSULOSIN HYDROCHLORIDE 0.4 MILLIGRAM(S): 0.4 CAPSULE ORAL at 21:13

## 2017-02-11 RX ADMIN — SPIRONOLACTONE 25 MILLIGRAM(S): 25 TABLET, FILM COATED ORAL at 05:37

## 2017-02-11 RX ADMIN — Medication 100 MILLIGRAM(S): at 21:13

## 2017-02-11 RX ADMIN — Medication 40 MILLIGRAM(S): at 05:37

## 2017-02-11 RX ADMIN — SODIUM CHLORIDE 3 MILLILITER(S): 9 INJECTION INTRAMUSCULAR; INTRAVENOUS; SUBCUTANEOUS at 21:09

## 2017-02-11 RX ADMIN — Medication 0.6 MILLIGRAM(S): at 03:06

## 2017-02-11 RX ADMIN — Medication 0.6 MILLIGRAM(S): at 17:22

## 2017-02-11 NOTE — PROGRESS NOTE ADULT - SUBJECTIVE AND OBJECTIVE BOX
Cardiology Progress Note covering Dr Brooks    S: Patient sitting at side of bed. He denies chest pain or dyspnea at rest.     TELEMETRY: accelerated junctional rhythm.     MEDICATIONS  (STANDING):  docusate sodium 100milliGRAM(s) Oral three times a day  aspirin enteric coated 325milliGRAM(s) Oral daily  pantoprazole    Tablet 40milliGRAM(s) Oral before breakfast  furosemide    Tablet 40milliGRAM(s) Oral daily  sodium chloride 0.9% lock flush 3milliLiter(s) IV Push every 8 hours  tamsulosin 0.4milliGRAM(s) Oral at bedtime  spironolactone 25milliGRAM(s) Oral daily  lisinopril 2.5milliGRAM(s) Oral daily  colchicine 0.6milliGRAM(s) Oral two times a day  warfarin 1milliGRAM(s) Oral once    MEDICATIONS  (PRN):  oxyCODONE  5 mG/acetaminophen 325 mG 2Tablet(s) Oral every 6 hours PRN Severe Pain (7 - 10)  oxyCODONE  5 mG/acetaminophen 325 mG 1Tablet(s) Oral every 4 hours PRN Moderate Pain (4 - 6)        Vital Signs Last 24 Hrs  T(C): 36.9, Max: 36.9 ( @ 11:24)  T(F): 98.5, Max: 98.5 ( @ 11:24)  HR: 76 (60 - 78)  BP: 104/66 (97/68 - 112/85)  BP(mean): --  RR: 18 (16 - 18)  SpO2: 97% (97% - 100%)    Daily     Daily Weight in k (2017 05:05)    I&O's Detail  I & Os for 24h ending 2017 07:00  =============================================  IN:    Oral Fluid: 360 ml    Solution: 50 ml    Total IN: 410 ml  ---------------------------------------------  OUT:    Voided: 1580 ml    Chest Tube: 250 ml    Total OUT: 1830 ml  ---------------------------------------------  Total NET: -1420 ml    I & Os for current day (as of 2017 12:18)  =============================================  IN:    Total IN: 0 ml  ---------------------------------------------  OUT:    Voided: 1000 ml    Total OUT: 1000 ml  ---------------------------------------------  Total NET: -1000 ml      PHYSICAL EXAM:  Appearance: Normal, well nourished	  Neck: thick neck  Cardiovascular: Normal S1 S2  Sternal wound c/d/i  Respiratory: Scattered coarse breath sounds.   Gastrointestinal:  Soft, Non-tender, + BS, no bruits	  Neurologic: Grossly non-focal.  trace edema    LABS:                        10.0   7.98  )-----------( 247      ( 2017 05:13 )             31.2     2017 05:13    134    |  96     |  21.0   ----------------------------<  93     4.1     |  27.0   |  0.72     Ca    8.4        2017 05:13  Mg     2.1       2017 05:13    TPro  6.3    /  Alb  3.4    /  TBili  0.8    /  DBili  x      /  AST  52     /  ALT  58     /  AlkPhos  165    2017 05:13        PT/INR - ( 2017 05:13 )   PT: 23.3 sec;   INR: 2.10 ratio             I&O's Summary  I & Os for 24h ending 2017 07:00  =============================================  IN: 410 ml / OUT: 1830 ml / NET: -1420 ml    I & Os for current day (as of 2017 12:18)  =============================================  IN: 0 ml / OUT: 1000 ml / NET: -1000 ml    BNP

## 2017-02-11 NOTE — PROGRESS NOTE ADULT - ASSESSMENT
60 yo male with chronic cardiomyopathy, bicuspid AV, severe AI/MR, dilated ascending aorta  1.  s/p CABG/MVR/AVR and ascending aorta repair (02/06/2017)   2. Post op afib treated with amiodarone now with intermittent high grade AV block and accelerated junctional rhythm    Plan:   Continue telemetry monitoring.   Patient was evaluated by EP -Dr. Thomas, if no improvement, evaluation for BiV/ICD is planned. If resolution, outpatient Life Vest will be planned for.   Coumadin initiated for pafib post op.   IV lasix prn. Continue lisinropril and spironolactone.   Case discussed with RN at the bedside.

## 2017-02-11 NOTE — PROGRESS NOTE ADULT - SUBJECTIVE AND OBJECTIVE BOX
Subjective: "How am I supposed to make a living if I can't work?" Pt seen and examined with Omani  via telephone. Pt counseled to recommended postoperative precautions    VITAL SIGNS  Vital Signs Last 24 Hrs  T(C): 36.9, Max: 36.9 ( @ 11:24)  T(F): 98.4, Max: 98.5 ( @ 11:24)  HR: 87 (66 - 87)  BP: 102/60 (97/68 - 112/85)  RR: 18 (16 - 18)  SpO2: 97% (97% - 99%) RA           Telemetry:  AJR  LVEF: <20%    MEDICATIONS  docusate sodium 100milliGRAM(s) Oral three times a day  aspirin enteric coated 325milliGRAM(s) Oral daily  pantoprazole    Tablet 40milliGRAM(s) Oral before breakfast  furosemide    Tablet 40milliGRAM(s) Oral daily  sodium chloride 0.9% lock flush 3milliLiter(s) IV Push every 8 hours  oxyCODONE  5 mG/acetaminophen 325 mG 2Tablet(s) Oral every 6 hours PRN  oxyCODONE  5 mG/acetaminophen 325 mG 1Tablet(s) Oral every 4 hours PRN  tamsulosin 0.4milliGRAM(s) Oral at bedtime  spironolactone 25milliGRAM(s) Oral daily  lisinopril 2.5milliGRAM(s) Oral daily  colchicine 0.6milliGRAM(s) Oral two times a day  warfarin 1milliGRAM(s) Oral once      PHYSICAL EXAM  General: well nourished, well developed, no acute distress  Neurology: alert and oriented x 3, nonfocal, no gross deficits  Respiratory: clear to auscultation bilaterally  CV: regular rate and rhythm, normal S1, S2  Abdomen: soft, nontender, nondistended, positive bowel sounds, last bowel movement   Extremities: warm, well perfused. no edema. + DP pulses  Incisions: midline sternal incision, + mepilex, c/d/i. sternum stable.  Chest tubes:   Epicardial Wires:    > EPM (settings) / isolated    I & Os for 24h ending  @ 07:00  =============================================  IN: 410 ml / OUT: 1830 ml / NET: -1420 ml    I & Os for current day (as of  @ 18:46)  =============================================  IN: 540 ml / OUT: 1440 ml / NET: -900 ml      Weights:  Daily     Daily Weight in k (2017 05:05)  Admit Wt: Drug Dosing Weight  Height (cm): 167 (2017 07:40)  Weight (kg): 76.8 (2017 07:21)  BMI (kg/m2): 27.5 (2017 07:21)  BSA (m2): 1.86 (2017 07:21)    LABS  2017 05:13    134    |  96     |  21.0   ----------------------------<  93     4.1     |  27.0   |  0.72     Ca    8.4        2017 05:13  Mg     2.1       2017 05:13    TPro  6.3    /  Alb  3.4    /  TBili  0.8    /  DBili  x      /  AST  52     /  ALT  58     /  AlkPhos  165    2017 05:13                                 10.0   7.98  )-----------( 247      ( 2017 05:13 )             31.2          PT/INR - ( 2017 05:13 )   PT: 23.3 sec;   INR: 2.10 ratio           Bilirubin Total, Serum: 0.8 mg/dL ( @ 05:13)         Today's CXR: right ll atel    Today's EKG: AJR 80,     PAST MEDICAL & SURGICAL HISTORY:  Mitral valve insufficiency, unspecified etiology  Aortic valve insufficiency, unspecified etiology  HFrEF (heart failure with reduced ejection fraction)  No significant past surgical history       ASSESSMENT  61y Male was admitted on  from [facility].     Preop course: Pt presented to [facility] on  for rapidly progressing SOB and occasional chest pain, (CCS class 3 anginal equivalent, NYHA class 3 heart failure). He states BLE edema, > 3 pillow orthopnea, palpitations, and claudication.  He had SOB and chest pain while in [place] and went to a doctor who gave him a medication that he does not recall the name of.  The illness progressed and [place] the [place] he went to [facility]. His echo demonstrated a global hypokinesis and it showed an EF of 15-20%. He was transferred to [facility] on  per son's request for cardiac cath which demonstrated normal coronaries. Pt noted to have acute sysolic heart failure secondary to reduced EF 20% and moderate-severe AI, bucuspid AV and severe MR noted on JARVIS with 4.7 cm ascending aortic aneurysm.    ,S/P Aortic valve replacement (T), Replacement of mitral valve (T), Ascending aortic aneurysm repair.    Postoperative Course/Issues: Prolonged inotropic requirement due to low EF, reported AF> given IV Amio bolus and PO load,  3rd degree HB with narrow QRS.ventricular arrythmia.  R Chest tube with persistent drainage., supratherapeutic INR> recd Vit K 2.5 mg po x 1  Echo > no effusion EF <  20%     PLAN  Pain management with Percocet prn  Encourage coughing, deep breathing and use of incentive spirometry. Wean off supplemental oxygen as able   Continue Aspirin.  Dr Thomas from EP following for complete heart block/Ventric arrythmia  maintain backup epicardial pacemaker and possible Bi-V AICD Monday   If no AICD, then lifevest monday and discharge  Continue Coumadin dosing for MVR (T) - low dose for possible AICD monday  Replete potassium and magnesium   Protonix for GI prophylaxis.  Continue Lasix/ aldalctone fluid overload, maintain negative fluid balance  BPH on Flomax  Right chest tube still draining (250/24hr) - maintain for now, colchicine for one week  Discussed with Dr Lopez in AM rounds.

## 2017-02-12 LAB
ANION GAP SERPL CALC-SCNC: 13 MMOL/L — SIGNIFICANT CHANGE UP (ref 5–17)
BUN SERPL-MCNC: 20 MG/DL — SIGNIFICANT CHANGE UP (ref 8–20)
CALCIUM SERPL-MCNC: 8.6 MG/DL — SIGNIFICANT CHANGE UP (ref 8.6–10.2)
CHLORIDE SERPL-SCNC: 98 MMOL/L — SIGNIFICANT CHANGE UP (ref 98–107)
CO2 SERPL-SCNC: 27 MMOL/L — SIGNIFICANT CHANGE UP (ref 22–29)
CREAT SERPL-MCNC: 0.75 MG/DL — SIGNIFICANT CHANGE UP (ref 0.5–1.3)
GLUCOSE SERPL-MCNC: 99 MG/DL — SIGNIFICANT CHANGE UP (ref 70–115)
HCT VFR BLD CALC: 31.5 % — LOW (ref 42–52)
HGB BLD-MCNC: 9.9 G/DL — LOW (ref 14–18)
INR BLD: 1.91 RATIO — HIGH (ref 0.88–1.16)
MAGNESIUM SERPL-MCNC: 1.8 MG/DL — SIGNIFICANT CHANGE UP (ref 1.8–2.5)
MCHC RBC-ENTMCNC: 28 PG — SIGNIFICANT CHANGE UP (ref 27–31)
MCHC RBC-ENTMCNC: 31.4 G/DL — LOW (ref 32–36)
MCV RBC AUTO: 89 FL — SIGNIFICANT CHANGE UP (ref 80–94)
PHOSPHATE SERPL-MCNC: 3.5 MG/DL — SIGNIFICANT CHANGE UP (ref 2.4–4.7)
PLATELET # BLD AUTO: 314 K/UL — SIGNIFICANT CHANGE UP (ref 150–400)
POTASSIUM SERPL-MCNC: 3.7 MMOL/L — SIGNIFICANT CHANGE UP (ref 3.5–5.3)
POTASSIUM SERPL-SCNC: 3.7 MMOL/L — SIGNIFICANT CHANGE UP (ref 3.5–5.3)
PROTHROM AB SERPL-ACNC: 21.1 SEC — HIGH (ref 10–13.1)
RBC # BLD: 3.54 M/UL — LOW (ref 4.6–6.2)
RBC # FLD: 14.8 % — SIGNIFICANT CHANGE UP (ref 11–15.6)
SODIUM SERPL-SCNC: 138 MMOL/L — SIGNIFICANT CHANGE UP (ref 135–145)
WBC # BLD: 8.17 K/UL — SIGNIFICANT CHANGE UP (ref 4.8–10.8)
WBC # FLD AUTO: 8.17 K/UL — SIGNIFICANT CHANGE UP (ref 4.8–10.8)

## 2017-02-12 PROCEDURE — 71010: CPT | Mod: 26

## 2017-02-12 RX ORDER — FUROSEMIDE 40 MG
20 TABLET ORAL ONCE
Qty: 0 | Refills: 0 | Status: COMPLETED | OUTPATIENT
Start: 2017-02-12 | End: 2017-02-12

## 2017-02-12 RX ORDER — POTASSIUM CHLORIDE 20 MEQ
40 PACKET (EA) ORAL ONCE
Qty: 0 | Refills: 0 | Status: DISCONTINUED | OUTPATIENT
Start: 2017-02-12 | End: 2017-02-12

## 2017-02-12 RX ORDER — MAGNESIUM SULFATE 500 MG/ML
2 VIAL (ML) INJECTION ONCE
Qty: 0 | Refills: 0 | Status: COMPLETED | OUTPATIENT
Start: 2017-02-12 | End: 2017-02-12

## 2017-02-12 RX ORDER — SORBITOL SOLUTION 70 %
30 SOLUTION, ORAL MISCELLANEOUS ONCE
Qty: 0 | Refills: 0 | Status: COMPLETED | OUTPATIENT
Start: 2017-02-12 | End: 2017-02-12

## 2017-02-12 RX ORDER — MAGNESIUM SULFATE 500 MG/ML
2 VIAL (ML) INJECTION ONCE
Qty: 0 | Refills: 0 | Status: DISCONTINUED | OUTPATIENT
Start: 2017-02-12 | End: 2017-02-12

## 2017-02-12 RX ORDER — POTASSIUM CHLORIDE 20 MEQ
20 PACKET (EA) ORAL ONCE
Qty: 0 | Refills: 0 | Status: COMPLETED | OUTPATIENT
Start: 2017-02-12 | End: 2017-02-12

## 2017-02-12 RX ORDER — POTASSIUM CHLORIDE 20 MEQ
40 PACKET (EA) ORAL ONCE
Qty: 0 | Refills: 0 | Status: COMPLETED | OUTPATIENT
Start: 2017-02-12 | End: 2017-02-12

## 2017-02-12 RX ORDER — WARFARIN SODIUM 2.5 MG/1
1 TABLET ORAL ONCE
Qty: 0 | Refills: 0 | Status: COMPLETED | OUTPATIENT
Start: 2017-02-12 | End: 2017-02-12

## 2017-02-12 RX ADMIN — Medication 20 MILLIEQUIVALENT(S): at 18:42

## 2017-02-12 RX ADMIN — SODIUM CHLORIDE 3 MILLILITER(S): 9 INJECTION INTRAMUSCULAR; INTRAVENOUS; SUBCUTANEOUS at 21:28

## 2017-02-12 RX ADMIN — Medication 0.6 MILLIGRAM(S): at 05:32

## 2017-02-12 RX ADMIN — Medication 0.6 MILLIGRAM(S): at 17:48

## 2017-02-12 RX ADMIN — Medication 100 MILLIGRAM(S): at 23:26

## 2017-02-12 RX ADMIN — TAMSULOSIN HYDROCHLORIDE 0.4 MILLIGRAM(S): 0.4 CAPSULE ORAL at 23:26

## 2017-02-12 RX ADMIN — Medication 40 MILLIEQUIVALENT(S): at 06:31

## 2017-02-12 RX ADMIN — PANTOPRAZOLE SODIUM 40 MILLIGRAM(S): 20 TABLET, DELAYED RELEASE ORAL at 05:32

## 2017-02-12 RX ADMIN — Medication 50 GRAM(S): at 06:31

## 2017-02-12 RX ADMIN — SODIUM CHLORIDE 3 MILLILITER(S): 9 INJECTION INTRAMUSCULAR; INTRAVENOUS; SUBCUTANEOUS at 13:42

## 2017-02-12 RX ADMIN — SODIUM CHLORIDE 3 MILLILITER(S): 9 INJECTION INTRAMUSCULAR; INTRAVENOUS; SUBCUTANEOUS at 05:32

## 2017-02-12 RX ADMIN — SPIRONOLACTONE 25 MILLIGRAM(S): 25 TABLET, FILM COATED ORAL at 05:32

## 2017-02-12 RX ADMIN — Medication 325 MILLIGRAM(S): at 11:11

## 2017-02-12 RX ADMIN — Medication 40 MILLIGRAM(S): at 05:32

## 2017-02-12 RX ADMIN — WARFARIN SODIUM 1 MILLIGRAM(S): 2.5 TABLET ORAL at 23:27

## 2017-02-12 RX ADMIN — Medication 100 MILLIGRAM(S): at 05:32

## 2017-02-12 RX ADMIN — Medication 20 MILLIGRAM(S): at 08:48

## 2017-02-12 RX ADMIN — LISINOPRIL 2.5 MILLIGRAM(S): 2.5 TABLET ORAL at 05:32

## 2017-02-12 NOTE — PROGRESS NOTE ADULT - ASSESSMENT
60 yo male with chronic cardiomyopathy, bicuspid AV, severe AI/MR, dilated ascending aorta  1.  s/p CABG/MVR/AVR and ascending aorta repair (02/06/2017)   2. Post op afib treated with amiodarone now with intermittent high grade AV block and accelerated junctional rhythm    Plan:   Continue telemetry monitoring.   Patient was evaluated by EP -Dr. Thomas, if no improvement, evaluation for BiV/ICD is planned. If resolution, outpatient Life Vest will be planned for.   Coumadin initiated for pafib post op.   IV lasix prn. Continue lisinopril and spironolactone.  Dr. Brooks to resume care Monday am

## 2017-02-12 NOTE — PROGRESS NOTE ADULT - SUBJECTIVE AND OBJECTIVE BOX
Cardiology covering for Dr. Brooks    s: Denies chest pain or dyspnea.     TELEMETRY: accelerated junctional rhythm.     MEDICATIONS  (STANDING):  docusate sodium 100milliGRAM(s) Oral three times a day  aspirin enteric coated 325milliGRAM(s) Oral daily  pantoprazole    Tablet 40milliGRAM(s) Oral before breakfast  furosemide    Tablet 40milliGRAM(s) Oral daily  sodium chloride 0.9% lock flush 3milliLiter(s) IV Push every 8 hours  tamsulosin 0.4milliGRAM(s) Oral at bedtime  spironolactone 25milliGRAM(s) Oral daily  lisinopril 2.5milliGRAM(s) Oral daily  colchicine 0.6milliGRAM(s) Oral two times a day  warfarin 1milliGRAM(s) Oral once  magnesium sulfate  IVPB 2Gram(s) IV Intermittent once  potassium chloride    Tablet ER 40milliEquivalent(s) Oral once  sorbitol 70% Solution 30milliLiter(s) Oral once    MEDICATIONS  (PRN):  oxyCODONE  5 mG/acetaminophen 325 mG 2Tablet(s) Oral every 6 hours PRN Severe Pain (7 - 10)  oxyCODONE  5 mG/acetaminophen 325 mG 1Tablet(s) Oral every 4 hours PRN Moderate Pain (4 - 6)        Vital Signs Last 24 Hrs  T(C): 36.2, Max: 37.4 ( @ 21:05)  T(F): 97.1, Max: 99.4 ( @ 21:05)  HR: 95 (86 - 95)  BP: 93/63 (93/63 - 113/71)  BP(mean): --  RR: 18 (17 - 18)  SpO2: 97% (96% - 98%)    Daily     Daily Weight in k.7 (2017 06:26)    I&O's Detail  I & Os for 24h ending 2017 07:00  =============================================  IN:    Oral Fluid: 780 ml    Total IN: 780 ml  ---------------------------------------------  OUT:    Voided: 1440 ml    Chest Tube: 354 ml    Total OUT: 1794 ml  ---------------------------------------------  Total NET: -1014 ml    I & Os for current day (as of 2017 12:47)  =============================================  IN:    Oral Fluid: 240 ml    Total IN: 240 ml  ---------------------------------------------  OUT:    Voided: 1500 ml    Total OUT: 1500 ml  ---------------------------------------------  Total NET: -1260 ml      PHYSICAL EXAM:  Appearance: Normal, well nourished	  Neck: No JVD,   Cardiovascular: Normal S1 S2  Respiratory: Lungs clear to auscultation	  Gastrointestinal:  Soft, Non-tender, + BS, no bruits	  Neurologic: Grossly non-focal.  Extremities: No edema    LABS:                        9.9    8.17  )-----------( 314      ( 2017 05:04 )             31.5     2017 05:04    138    |  98     |  20.0   ----------------------------<  99     3.7     |  27.0   |  0.75     Ca    8.6        2017 05:04  Phos  3.5       2017 05:04  Mg     1.8       2017 05:04    TPro  6.3    /  Alb  3.4    /  TBili  0.8    /  DBili  x      /  AST  52     /  ALT  58     /  AlkPhos  165    2017 05:13        PT/INR - ( 2017 05:04 )   PT: 21.1 sec;   INR: 1.91 ratio             I&O's Summary  I & Os for 24h ending 2017 07:00  =============================================  IN: 780 ml / OUT: 1794 ml / NET: -1014 ml    I & Os for current day (as of 2017 12:47)  =============================================  IN: 240 ml / OUT: 1500 ml / NET: -1260 ml    BNP

## 2017-02-12 NOTE — PROGRESS NOTE ADULT - SUBJECTIVE AND OBJECTIVE BOX
Subjective: "Good Morning"  Pt in bed NAD, + Rt CT with serosang fliud noted     VITAL SIGNS  T(C): 37.1, Max: 37.4 (02-11 @ 21:05)  HR: 87 (76 - 87)  BP: 113/70 (102/60 - 113/71)  RR: 17 (17 - 18)  SpO2: 96% (96% - 98%)  Wt(kg): --   on (O2)            Daily     Daily Weight in k.7 (2017 06:26)  Height (cm): 167 (2017 07:40)  Weight (kg): 76.8 (2017 07:21)  Telemetry:  Accelerated Junctional 80s ( 7 beats Vtach overtnight)    LVEF:  20-30%    MEDICATIONS  docusate sodium 100milliGRAM(s) Oral three times a day  aspirin enteric coated 325milliGRAM(s) Oral daily  pantoprazole    Tablet 40milliGRAM(s) Oral before breakfast  furosemide    Tablet 40milliGRAM(s) Oral daily  sodium chloride 0.9% lock flush 3milliLiter(s) IV Push every 8 hours  oxyCODONE  5 mG/acetaminophen 325 mG 2Tablet(s) Oral every 6 hours PRN  oxyCODONE  5 mG/acetaminophen 325 mG 1Tablet(s) Oral every 4 hours PRN  tamsulosin 0.4milliGRAM(s) Oral at bedtime  spironolactone 25milliGRAM(s) Oral daily  lisinopril 2.5milliGRAM(s) Oral daily  colchicine 0.6milliGRAM(s) Oral two times a day  warfarin 1milliGRAM(s) Oral once  magnesium sulfate  IVPB 2Gram(s) IV Intermittent once  potassium chloride    Tablet ER 40milliEquivalent(s) Oral once  furosemide   Injectable 20milliGRAM(s) IV Push once  sorbitol 70% Solution 30milliLiter(s) Oral once    MEDICATIONS  (PRN):  oxyCODONE  5 mG/acetaminophen 325 mG 2Tablet(s) Oral every 6 hours PRN Severe Pain (7 - 10)  oxyCODONE  5 mG/acetaminophen 325 mG 1Tablet(s) Oral every 4 hours PRN Moderate Pain (4 - 6)        PHYSICAL EXAM  General: Alert awake  Respiratory: clear b/l decreased at bases B/L  CV: Regular  S1 S2  Abdomen: soft NT ND + BS -BM  Extremities: trace edema b/l LE   Incisions: MSI C/D/I  stable sternum  + Staples + silks       Chest tubes: Right CT       I&O's Detail    I & Os for current day (as of 2017 08:15)  =============================================  IN:    Oral Fluid: 780 ml    Total IN: 780 ml  ---------------------------------------------  OUT:    Voided: 1440 ml    Chest Tube: 354 ml    Total OUT: 1794 ml  ---------------------------------------------  Total NET: -1014 ml      LABS  2017 05:04    138    |  98     |  20.0   ----------------------------<  99     3.7     |  27.0   |  0.75     Ca    8.6        2017 05:04  Phos  3.5       2017 05:04  Mg     1.8       2017 05:04    TPro  6.3    /  Alb  3.4    /  TBili  0.8    /  DBili  x      /  AST  52     /  ALT  58     /  AlkPhos  165    2017 05:13                                 9.9    8.17  )-----------( 314      ( 2017 05:04 )             31.5          PT/INR - ( 2017 05:04 )   PT: 21.1 sec;   INR: 1.91 ratio               LIVER FUNCTIONS - ( 2017 05:13 )  Alb: 3.4 g/dL / Pro: 6.3 g/dL / ALK PHOS: 165 U/L / ALT: 58 U/L / AST: 52 U/L / GGT: x              CAPILLARY BLOOD GLUCOSE           Today's CXR: Clear + Rt CT insitu    PAST MEDICAL & SURGICAL HISTORY:  Mitral valve insufficiency, unspecified etiology  Aortic valve insufficiency, unspecified etiology  HFrEF (heart failure with reduced ejection fraction)  No significant past surgical history       ASSESSMENT  61y Male was admitted on  from [facility].     Preop course: Pt presented to [facility] on  for rapidly progressing SOB and occasional chest pain, (CCS class 3 anginal equivalent, NYHA class 3 heart failure). He states BLE edema, > 3 pillow orthopnea, palpitations, and claudication.  He had SOB and chest pain while in [place] and went to a doctor who gave him a medication that he does not recall the name of.  The illness progressed and [place] the [place] he went to [facility]. His echo demonstrated a global hypokinesis and it showed an EF of 15-20%. He was transferred to [facility] on  per son's request for cardiac cath which demonstrated normal coronaries. Pt noted to have acute sysolic heart failure secondary to reduced EF 20% and moderate-severe AI, bucuspid AV and severe MR noted on JARVIS with 4.7 cm ascending aortic aneurysm.    ,S/P Aortic valve replacement (T), Replacement of mitral valve (T), Ascending aortic aneurysm repair.    Postoperative Course/Issues: Prolonged inotropic requirement due to low EF, reported AF> given IV Amio bolus and PO load,  3rd degree HB with narrow QRS.ventricular arrythmia.  R Chest tube with persistent drainage., supratherapeutic INR> recd Vit K 2.5 mg po x 1  Echo > no effusion EF <  20%    Accelerated Junctional rhythm     PLAN  Pain management with Percocet prn  Encourage coughing, deep breathing and use of incentive spirometry. Wean off supplemental oxygen as able   Continue Aspirin.  Maintain Chest tube today for high output cont  colchicine for one week  Dr Thomas following for complete heart block/Ventric arrythmia  maintain backup epicardial pacemaker and possible Bi-V AICD Monday - Pt made NPO p midnight tonight   If no AICD, then lifevest monday and discharge  Continue Coumadin dosing for MVR (T) - low dose for possible AICD monday  Replete potassium and magnesium   Protonix for GI prophylaxis.  Continue Lasix/ aldalctone fluid overload, maintain negative fluid balance  BPH on Flomax  Discussed with Dr Lopez in AM rounds.

## 2017-02-13 ENCOUNTER — TRANSCRIPTION ENCOUNTER (OUTPATIENT)
Age: 62
End: 2017-02-13

## 2017-02-13 LAB
ANION GAP SERPL CALC-SCNC: 10 MMOL/L — SIGNIFICANT CHANGE UP (ref 5–17)
ANION GAP SERPL CALC-SCNC: 12 MMOL/L — SIGNIFICANT CHANGE UP (ref 5–17)
BLD GP AB SCN SERPL QL: SIGNIFICANT CHANGE UP
BUN SERPL-MCNC: 18 MG/DL — SIGNIFICANT CHANGE UP (ref 8–20)
BUN SERPL-MCNC: 19 MG/DL — SIGNIFICANT CHANGE UP (ref 8–20)
CALCIUM SERPL-MCNC: 8.4 MG/DL — LOW (ref 8.6–10.2)
CALCIUM SERPL-MCNC: 8.7 MG/DL — SIGNIFICANT CHANGE UP (ref 8.6–10.2)
CHLORIDE SERPL-SCNC: 94 MMOL/L — LOW (ref 98–107)
CHLORIDE SERPL-SCNC: 94 MMOL/L — LOW (ref 98–107)
CO2 SERPL-SCNC: 27 MMOL/L — SIGNIFICANT CHANGE UP (ref 22–29)
CO2 SERPL-SCNC: 30 MMOL/L — HIGH (ref 22–29)
CREAT SERPL-MCNC: 0.65 MG/DL — SIGNIFICANT CHANGE UP (ref 0.5–1.3)
CREAT SERPL-MCNC: 0.78 MG/DL — SIGNIFICANT CHANGE UP (ref 0.5–1.3)
GLUCOSE SERPL-MCNC: 100 MG/DL — SIGNIFICANT CHANGE UP (ref 70–115)
GLUCOSE SERPL-MCNC: 105 MG/DL — SIGNIFICANT CHANGE UP (ref 70–115)
HCT VFR BLD CALC: 30.4 % — LOW (ref 42–52)
HCT VFR BLD CALC: 33 % — LOW (ref 42–52)
HGB BLD-MCNC: 10.4 G/DL — LOW (ref 14–18)
HGB BLD-MCNC: 9.5 G/DL — LOW (ref 14–18)
INR BLD: 1.57 RATIO — HIGH (ref 0.88–1.16)
INR BLD: 1.57 RATIO — HIGH (ref 0.88–1.16)
MAGNESIUM SERPL-MCNC: 1.8 MG/DL — SIGNIFICANT CHANGE UP (ref 1.8–2.5)
MAGNESIUM SERPL-MCNC: 1.9 MG/DL — SIGNIFICANT CHANGE UP (ref 1.8–2.5)
MCHC RBC-ENTMCNC: 27.9 PG — SIGNIFICANT CHANGE UP (ref 27–31)
MCHC RBC-ENTMCNC: 28 PG — SIGNIFICANT CHANGE UP (ref 27–31)
MCHC RBC-ENTMCNC: 31.3 G/DL — LOW (ref 32–36)
MCHC RBC-ENTMCNC: 31.5 G/DL — LOW (ref 32–36)
MCV RBC AUTO: 88.7 FL — SIGNIFICANT CHANGE UP (ref 80–94)
MCV RBC AUTO: 89.4 FL — SIGNIFICANT CHANGE UP (ref 80–94)
PHOSPHATE SERPL-MCNC: 3.6 MG/DL — SIGNIFICANT CHANGE UP (ref 2.4–4.7)
PHOSPHATE SERPL-MCNC: 3.9 MG/DL — SIGNIFICANT CHANGE UP (ref 2.4–4.7)
PLATELET # BLD AUTO: 349 K/UL — SIGNIFICANT CHANGE UP (ref 150–400)
PLATELET # BLD AUTO: 354 K/UL — SIGNIFICANT CHANGE UP (ref 150–400)
POTASSIUM SERPL-MCNC: 4.2 MMOL/L — SIGNIFICANT CHANGE UP (ref 3.5–5.3)
POTASSIUM SERPL-MCNC: 4.3 MMOL/L — SIGNIFICANT CHANGE UP (ref 3.5–5.3)
POTASSIUM SERPL-SCNC: 4.2 MMOL/L — SIGNIFICANT CHANGE UP (ref 3.5–5.3)
POTASSIUM SERPL-SCNC: 4.3 MMOL/L — SIGNIFICANT CHANGE UP (ref 3.5–5.3)
PROTHROM AB SERPL-ACNC: 17.3 SEC — HIGH (ref 10–13.1)
PROTHROM AB SERPL-ACNC: 17.4 SEC — HIGH (ref 10–13.1)
RBC # BLD: 3.4 M/UL — LOW (ref 4.6–6.2)
RBC # BLD: 3.72 M/UL — LOW (ref 4.6–6.2)
RBC # FLD: 15 % — SIGNIFICANT CHANGE UP (ref 11–15.6)
RBC # FLD: 15.1 % — SIGNIFICANT CHANGE UP (ref 11–15.6)
SODIUM SERPL-SCNC: 133 MMOL/L — LOW (ref 135–145)
SODIUM SERPL-SCNC: 134 MMOL/L — LOW (ref 135–145)
TYPE + AB SCN PNL BLD: SIGNIFICANT CHANGE UP
WBC # BLD: 8.02 K/UL — SIGNIFICANT CHANGE UP (ref 4.8–10.8)
WBC # BLD: 9.65 K/UL — SIGNIFICANT CHANGE UP (ref 4.8–10.8)
WBC # FLD AUTO: 8.02 K/UL — SIGNIFICANT CHANGE UP (ref 4.8–10.8)
WBC # FLD AUTO: 9.65 K/UL — SIGNIFICANT CHANGE UP (ref 4.8–10.8)

## 2017-02-13 PROCEDURE — 33249 INSJ/RPLCMT DEFIB W/LEAD(S): CPT

## 2017-02-13 PROCEDURE — 71010: CPT | Mod: 26

## 2017-02-13 PROCEDURE — 93010 ELECTROCARDIOGRAM REPORT: CPT

## 2017-02-13 PROCEDURE — 33225 L VENTRIC PACING LEAD ADD-ON: CPT

## 2017-02-13 PROCEDURE — 71010: CPT | Mod: 26,77

## 2017-02-13 RX ORDER — METOPROLOL TARTRATE 50 MG
25 TABLET ORAL EVERY 12 HOURS
Qty: 0 | Refills: 0 | Status: DISCONTINUED | OUTPATIENT
Start: 2017-02-13 | End: 2017-02-13

## 2017-02-13 RX ORDER — AMIODARONE HYDROCHLORIDE 400 MG/1
400 TABLET ORAL EVERY 8 HOURS
Qty: 0 | Refills: 0 | Status: DISCONTINUED | OUTPATIENT
Start: 2017-02-13 | End: 2017-02-14

## 2017-02-13 RX ORDER — MAGNESIUM SULFATE 500 MG/ML
2 VIAL (ML) INJECTION ONCE
Qty: 0 | Refills: 0 | Status: COMPLETED | OUTPATIENT
Start: 2017-02-13 | End: 2017-02-13

## 2017-02-13 RX ORDER — WARFARIN SODIUM 2.5 MG/1
2 TABLET ORAL ONCE
Qty: 0 | Refills: 0 | Status: COMPLETED | OUTPATIENT
Start: 2017-02-13 | End: 2017-02-13

## 2017-02-13 RX ORDER — VANCOMYCIN HCL 1 G
1000 VIAL (EA) INTRAVENOUS EVERY 12 HOURS
Qty: 0 | Refills: 0 | Status: COMPLETED | OUTPATIENT
Start: 2017-02-14 | End: 2017-02-14

## 2017-02-13 RX ORDER — LIDOCAINE HCL 20 MG/ML
100 VIAL (ML) INJECTION ONCE
Qty: 0 | Refills: 0 | Status: COMPLETED | OUTPATIENT
Start: 2017-02-13 | End: 2017-02-13

## 2017-02-13 RX ORDER — AMIODARONE HYDROCHLORIDE 400 MG/1
150 TABLET ORAL ONCE
Qty: 0 | Refills: 0 | Status: COMPLETED | OUTPATIENT
Start: 2017-02-13 | End: 2017-02-13

## 2017-02-13 RX ADMIN — AMIODARONE HYDROCHLORIDE 618 MILLIGRAM(S): 400 TABLET ORAL at 23:42

## 2017-02-13 RX ADMIN — SODIUM CHLORIDE 3 MILLILITER(S): 9 INJECTION INTRAMUSCULAR; INTRAVENOUS; SUBCUTANEOUS at 22:14

## 2017-02-13 RX ADMIN — AMIODARONE HYDROCHLORIDE 400 MILLIGRAM(S): 400 TABLET ORAL at 23:55

## 2017-02-13 RX ADMIN — LISINOPRIL 2.5 MILLIGRAM(S): 2.5 TABLET ORAL at 05:26

## 2017-02-13 RX ADMIN — SODIUM CHLORIDE 3 MILLILITER(S): 9 INJECTION INTRAMUSCULAR; INTRAVENOUS; SUBCUTANEOUS at 13:37

## 2017-02-13 RX ADMIN — Medication 100 MILLIGRAM(S): at 05:25

## 2017-02-13 RX ADMIN — SPIRONOLACTONE 25 MILLIGRAM(S): 25 TABLET, FILM COATED ORAL at 05:25

## 2017-02-13 RX ADMIN — Medication 100 MILLIGRAM(S): at 23:00

## 2017-02-13 RX ADMIN — Medication 325 MILLIGRAM(S): at 13:25

## 2017-02-13 RX ADMIN — SODIUM CHLORIDE 3 MILLILITER(S): 9 INJECTION INTRAMUSCULAR; INTRAVENOUS; SUBCUTANEOUS at 05:27

## 2017-02-13 RX ADMIN — Medication 0.6 MILLIGRAM(S): at 05:26

## 2017-02-13 RX ADMIN — TAMSULOSIN HYDROCHLORIDE 0.4 MILLIGRAM(S): 0.4 CAPSULE ORAL at 21:54

## 2017-02-13 RX ADMIN — WARFARIN SODIUM 2 MILLIGRAM(S): 2.5 TABLET ORAL at 09:58

## 2017-02-13 RX ADMIN — PANTOPRAZOLE SODIUM 40 MILLIGRAM(S): 20 TABLET, DELAYED RELEASE ORAL at 05:28

## 2017-02-13 RX ADMIN — Medication 50 GRAM(S): at 09:58

## 2017-02-13 RX ADMIN — Medication 40 MILLIGRAM(S): at 05:25

## 2017-02-13 RX ADMIN — Medication 100 MILLIGRAM(S): at 21:54

## 2017-02-13 NOTE — DISCHARGE NOTE ADULT - CARE PROVIDERS DIRECT ADDRESSES
,DirectAddress_Unknown,[email or web address] ,DirectAddress_Unknown,[email or web address]

## 2017-02-13 NOTE — PROGRESS NOTE ADULT - ASSESSMENT
62 y/o male w/ POD 7 s/p AVR/MVR, with severe LV dysfunction. He had postoperative 3rd degree heart block with narrow QRS. Now with evidence of some improvement in conduction, with type 1 second degree AV block, though still markedly prolonged AV delay with bradycardia. He also has frequent runs of NSVT, and has been unable to tolerate beta blockers due to his underlying conduction disease and bradycardia, and he requires beta blockers for his NSVT and severe cardiomyopathy.   Given persistent conduction disease and inability to tolerate needed beta blockers, which has not fully improved now 7 days postoperatively, he will likely require pacemaker implant. In the setting of his severe cardiomyopathy with LVEF <20% and NSVT, the most appropriate device to implant in this setting is a primary prevention defibrillator, with CRT (LV pacing electrode or His-bundle pacing electrode) to restore ventricular synchrony given anticipation of frequent ventricular pacing.  Discussed above in detail with Dr. Carter and Dr. Allen, who agree.  -keep NPO for device implant  -can continue coumadin. please hold heparin

## 2017-02-13 NOTE — DISCHARGE NOTE ADULT - CARE PROVIDER_API CALL
Kendrick Thomas  [address], [place] [address]  Phone: [phone]  Fax: [phone] Kendrick Thomas  [address] [place] [address], [place] [address]  Phone: [phone]  Fax: [phone]    Rafael Carter), Surgery; Thoracic and Cardiac Surgery  [address] [place] [id number]  Phone: [phone]  Fax: [phone]

## 2017-02-13 NOTE — DISCHARGE NOTE ADULT - HOSPITAL COURSE
60 y/o male w/ POD 7 s/p AVR/MVR, with severe LV dysfunction. He had postoperative 3rd degree heart block with narrow QRS. Evidence of some improvement in conduction, with type 1 second degree AV block, though still markedly prolonged AV delay with bradycardia. He also has frequent runs of NSVT, and has been unable to tolerate beta blockers due to his underlying conduction disease and bradycardia, and he requires beta blockers for his NSVT and severe cardiomyopathy. He is now s/p His ICD implant on 2/13/17. 60 y/o male 2/6  s/p AVR/MVR, Asc aneurysm repair EF  20% with severe LV dysfunction. He had postoperative 3rd degree heart block with narrow QRS. Evidence of some improvement in conduction, with type 1 second degree AV block   Medtronic  s/p His ICD implant o  n 2/13/17.   AICD fired 2/13 > amio load x 3 days>maintanence amio x 90 days as O/P

## 2017-02-13 NOTE — DISCHARGE NOTE ADULT - MEDICATION SUMMARY - MEDICATIONS TO TAKE
I will START or STAY ON the medications listed below when I get home from the hospital:    Percocet 5/325 oral tablet  -- 1 tab(s) by mouth every 4 hours, As needed, Moderate Pain (4 - 6) MDD:5 tablets  -- Indication: For Pain    aspirin 81 mg oral delayed release tablet  -- 1 tab(s) by mouth once a day  -- Indication: For Aspirin    lisinopril 2.5 mg oral tablet  -- 1 tab(s) by mouth once a day  -- Indication: For AcE    tamsulosin 0.4 mg oral capsule  -- 1 cap(s) by mouth once a day (at bedtime)  -- Indication: For BPH    amiodarone 200 mg oral tablet  -- 1 tab(s) by mouth once a day  -- Indication: For rhythm regulation x 90 days    Coumadin 2 mg oral tablet  -- 1 tab(s) by mouth once a day  -- Do not take this drug if you are pregnant.  It is very important that you take or use this exactly as directed.  Do not skip doses or discontinue unless directed by your doctor.  Obtain medical advice before taking any non-prescription drugs as some may affect the action of this medication.    -- Indication: For Anticoagulation    Metoprolol Tartrate 25 mg oral tablet  -- 0.5 tab(s) by mouth 2 times a day  -- Indication: For betablocker    furosemide 40 mg oral tablet  -- 1 tab(s) by mouth once a day  -- Indication: For diuretic    magnesium oxide 400 mg (241.3 mg elemental magnesium) oral tablet  -- 1 tab(s) by mouth 3 times a day (with meals)  -- Indication: For Supplement    pantoprazole 40 mg oral delayed release tablet  -- 1 tab(s) by mouth once a day (before a meal)  -- Indication: For GERD

## 2017-02-13 NOTE — DISCHARGE NOTE ADULT - REASON FOR ADMISSION
s/p His ICD implant 2/6 AVR MVR  Ascending Aneurysm Repair  postop SB/CHB/JR>  2/14  Medtronic s/p  ICD implant

## 2017-02-13 NOTE — PROGRESS NOTE ADULT - SUBJECTIVE AND OBJECTIVE BOX
61 year old male who is POD 7 s/p AVR/MVR, with severe LV dysfunction. He had postoperative 3rd degree heart block with narrow QRS. Patient now s/p successful His ICD implant    TELE: AS His paced at 90 ppm    MEDICATIONS  (STANDING):  docusate sodium 100milliGRAM(s) Oral three times a day  aspirin enteric coated 325milliGRAM(s) Oral daily  pantoprazole    Tablet 40milliGRAM(s) Oral before breakfast  furosemide    Tablet 40milliGRAM(s) Oral daily  sodium chloride 0.9% lock flush 3milliLiter(s) IV Push every 8 hours  tamsulosin 0.4milliGRAM(s) Oral at bedtime  spironolactone 25milliGRAM(s) Oral daily  lisinopril 2.5milliGRAM(s) Oral daily  sorbitol 70% Solution 30milliLiter(s) Oral once    MEDICATIONS  (PRN):  oxyCODONE  5 mG/acetaminophen 325 mG 2Tablet(s) Oral every 6 hours PRN Severe Pain (7 - 10)  oxyCODONE  5 mG/acetaminophen 325 mG 1Tablet(s) Oral every 4 hours PRN Moderate Pain (4 - 6)    Allergies    No Known Allergies    PAST MEDICAL & SURGICAL HISTORY:  Mitral valve insufficiency, unspecified etiology  Aortic valve insufficiency, unspecified etiology  HFrEF (heart failure with reduced ejection fraction)  No significant past surgical history    Vital Signs Last 24 Hrs  T(C): 36.7, Max: 36.9 (02-12 @ 23:22)  T(F): 98.1, Max: 98.5 (02-12 @ 23:22)  HR: 88 (88 - 95)  BP: 90/57 (90/57 - 120/62)  RR: 16 (12 - 18)  SpO2: 96% (96% - 99%)    Physical Exam:  Constitutional: NAD, AAOx3  Cardiovascular: +S1S2 RRR, Dressing over left chest wall dry intact.  Pulmonary: CTA b/l, unlabored  GI: soft NTND +BS  Extremities: no pedal edema, +distal pulses b/l  Neuro: non focal    LABS:                        10.4   8.02  )-----------( 354      ( 13 Feb 2017 05:27 )             33.0     13 Feb 2017 05:27    133    |  94     |  19.0   ----------------------------<  105    4.2     |  27.0   |  0.65     Ca    8.7        13 Feb 2017 05:27  Phos  3.6       13 Feb 2017 05:27  Mg     1.8       13 Feb 2017 05:27    PT/INR - ( 13 Feb 2017 05:27 )   PT: 17.4 sec;   INR: 1.57 ratio      A/P  61 year old male who is POD 7 s/p AVR/MVR, with severe LV dysfunction. He had postoperative 3rd degree heart block with narrow QRS. Patient now s/p successful His ICD implant  - Post Op Routine  - Vancomycin 1g IVPB q12h x 2 more doses  - PA and Lat Xray ordered for tomorrow  - EKG tomorrow  - Labs tomorrow  - May restart beta blockers as needed.   - Medtronic device check in AM  - follow up with Dr. Thomas in 1-2 weeks upon discharge  - remove pressure dressing in AM  - will follow in AM.

## 2017-02-13 NOTE — DISCHARGE NOTE ADULT - NS AS ACTIVITY OBS
Walking-Indoors allowed/Walking-Outdoors allowed Showering allowed/Do not drive or operate machinery/Walking-Outdoors allowed/No Heavy lifting/straining/Stairs allowed/May shower starting 2/20/Walking-Indoors allowed/Do not make important decisions

## 2017-02-13 NOTE — DISCHARGE NOTE ADULT - PROVIDER TOKENS
FREE:[LAST:[Martha],FIRST:[Kendrick],PHONE:[phone]],FAX:[phone]],ADDRESS:[[address] [place] [address]]] FREE:[LAST:[Martha],FIRST:[Kendrick],PHONE:[phone]],FAX:[phone]],ADDRESS:[[address] [place] [address]]],TOKEN:'[id number]'

## 2017-02-13 NOTE — DISCHARGE NOTE ADULT - PLAN OF CARE
Keep affected arm below shoulder, with no heavy lifting for 6 weeks.  Keep the site dry (no showers) for 1-2 weeks until follow up visit with the physician.  Leave the steri strips in place.  Please notify the physician if there is any bleeding, drainage or swelling of the site.  Please notify the physician of any fever greater than 100.4.  Follow up with Dr. Thomas in 1-2 weeks time and ischemic cardiomyopathy now s/p His ICD implant. Plan for successful post operative care. Keep affected arm below shoulder, with no heavy lifting for 6 weeks.  Keep the site dry (no showers) for 1-2 weeks until follow up visit with the physician.  Leave the steri strips in place.  Please notify the physician if there is any bleeding, drainage or swelling of the site.  Please notify the physician of any fever greater than 100.4.  Follow up with Dr. Thomas in 2 weeks time on 3/1/2017 at 1:30PM. Please call if you need to reschedule. recover from surgery Please follow up with Dr. Carter on Feb 28th at 3pm  Please come to ED or Call Cardio thoracic office at [phone] if Chest pain, Shortness of Breath, persistant Nausea & vomiting, oozing from wounds,palpitations or pain not relieved with medication  Weigh yourself daily>notify surgeons office if  2 lb increase in weight in 24 hours.  Wash incisions with soap and water using washcloth in shower daily  Shower daily, no bathing swimming in a pool or the ocean until instructed by MD.    We advise that you do not drive until instructed by MD.   You may not return to work until instructed by MD.   DO NOT APPLY CREAM POWDER LOTION OR OINTMENT TO ANY SURGICAL WOUND>THIS CAN IMPEDE HEALING AND CAUSE AN INFECTION    Please eat a low fat low salt diet.

## 2017-02-13 NOTE — PROGRESS NOTE ADULT - SUBJECTIVE AND OBJECTIVE BOX
Telemetry reveals sinus rhythm with markedly prolonged NV interval over 400ms, and occasional dropped p-waves, consistent with type I 2nd degree AV block.  Pt without new complaints    MEDICATIONS  (STANDING):  docusate sodium 100milliGRAM(s) Oral three times a day  aspirin enteric coated 325milliGRAM(s) Oral daily  pantoprazole    Tablet 40milliGRAM(s) Oral before breakfast  furosemide    Tablet 40milliGRAM(s) Oral daily  sodium chloride 0.9% lock flush 3milliLiter(s) IV Push every 8 hours  tamsulosin 0.4milliGRAM(s) Oral at bedtime  spironolactone 25milliGRAM(s) Oral daily  lisinopril 2.5milliGRAM(s) Oral daily  colchicine 0.6milliGRAM(s) Oral two times a day  sorbitol 70% Solution 30milliLiter(s) Oral once    MEDICATIONS  (PRN):  oxyCODONE  5 mG/acetaminophen 325 mG 2Tablet(s) Oral every 6 hours PRN Severe Pain (7 - 10)  oxyCODONE  5 mG/acetaminophen 325 mG 1Tablet(s) Oral every 4 hours PRN Moderate Pain (4 - 6)      Allergies    No Known Allergies    Intolerances      PAST MEDICAL & SURGICAL HISTORY:  Mitral valve insufficiency, unspecified etiology  Aortic valve insufficiency, unspecified etiology  HFrEF (heart failure with reduced ejection fraction)  No significant past surgical history      Vital Signs Last 24 Hrs  T(C): 36.9, Max: 37.2 (02-12 @ 17:39)  T(F): 98.5, Max: 99 (02-12 @ 17:39)  HR: 91 (88 - 99)  BP: 120/62 (92/60 - 120/62)  BP(mean): --  RR: 16 (16 - 18)  SpO2: 97% (96% - 99%)    Physical Exam:  Constitutional: NAD, AAOx3  Cardiovascular: +S1S2 RRR  Pulmonary: CTA b/l, unlabored  GI: soft NTND +BS  Extremities: no pedal edema, +distal pulses b/l  Neuro: non focal, ROTHMAN x4    LABS:                        10.4   8.02  )-----------( 354      ( 13 Feb 2017 05:27 )             33.0     13 Feb 2017 05:27    133    |  94     |  19.0   ----------------------------<  105    4.2     |  27.0   |  0.65     Ca    8.7        13 Feb 2017 05:27  Phos  3.6       13 Feb 2017 05:27  Mg     1.8       13 Feb 2017 05:27      PT/INR - ( 13 Feb 2017 05:27 )   PT: 17.4 sec;   INR: 1.57 ratio           RADIOLOGY & ADDITIONAL TESTS:

## 2017-02-13 NOTE — PROGRESS NOTE ADULT - SUBJECTIVE AND OBJECTIVE BOX
Subjective: Interviewed w/ language services  #[phone]  "I am ok...getting tired of being here...really need to go home, wish these doctors would make up their mind  one way or the other about my pacemaker"  OOB chair    Tele:  high grade first degree block FL .24  HR  70's  occas ectopics/couplets + noted  Vital Signs Last 24 Hrs  T(C): 36.9, Max: 37.2 ( @ 17:39)  T(F): 98.5, Max: 99 ( @ 17:39)  HR: 92 (88 - 99)  BP: 110/50 (92/60 - 110/50)  BP(mean): --  RR: 18 (16 - 18)  SpO2: 99% (96% - 99%)                  LV EF:  20%  Labs:  2017 05:27    133    |  94     |  19.0   ----------------------------<  105    4.2     |  27.0   |  0.65     Ca    8.7        2017 05:27  Phos  3.6       2017 05:27  Mg     1.8       2017 05:27                               10.4   8.02  )-----------( 354      ( 2017 05:27 )             33.0       PT/INR - ( 2017 05:27 )   PT: 17.4 sec;   INR: 1.57 ratio      CXR: Tiny right apical pneumothorax improved from prior study   dated 2017. Right-sided chest tube and atelectatic changes noted in   the right lower lobe unchanged. Status post valve replacements.             EK degree AVB 80  FL  .24  6 beats VT overnite      I & Os for current day (as of  @ 09:33)  =============================================  IN: 900 ml / OUT: 2730 ml / NET: -1830 ml      CHEST TUBE:  [x ] YES [ ] NO  OUTPUT:  90  ml   per 12 hours    AIR LEAKS:  [ ] YES [x ] NO      KYLIE DRAIN:   [ ] YES [x] NO  OUTPUT:     per 24 hours    EPICARDIAL WIRES:  [x ] YES [ ] NO      BOWEL MOVEMENT:  [x ] YES [ ] NO      MEDICATIONS  (STANDING):  docusate sodium 100milliGRAM(s) Oral three times a day  aspirin enteric coated 325milliGRAM(s) Oral daily  pantoprazole    Tablet 40milliGRAM(s) Oral before breakfast  furosemide    Tablet 40milliGRAM(s) Oral daily  sodium chloride 0.9% lock flush 3milliLiter(s) IV Push every 8 hours  tamsulosin 0.4milliGRAM(s) Oral at bedtime  spironolactone 25milliGRAM(s) Oral daily  lisinopril 2.5milliGRAM(s) Oral daily  colchicine 0.6milliGRAM(s) Oral two times a day  sorbitol 70% Solution 30milliLiter(s) Oral once  magnesium sulfate  IVPB 2Gram(s) IV Intermittent once  warfarin 2milliGRAM(s) Oral once    MEDICATIONS  (PRN):  oxyCODONE  5 mG/acetaminophen 325 mG 2Tablet(s) Oral every 6 hours PRN Severe Pain (7 - 10)  oxyCODONE  5 mG/acetaminophen 325 mG 1Tablet(s) Oral every 4 hours PRN Moderate Pain (4 - 6)      Physical Exam:    Neuro: alert,  oriented, no deficits    Pulm: essentially clear    CV: S1 S2  RRR   + VVI  50/10    Abd: soft non tender  + bowel sounds  BM      Extremities: no edema, no calf pain    Incision(s): sternum stable, incision clean            PAST MEDICAL & SURGICAL HISTORY:  Mitral valve insufficiency, unspecified etiology  Aortic valve insufficiency, unspecified etiology  HFrEF (heart failure with reduced ejection fraction)  No significant past surgical history      Assessment:          ,S/P Aortic valve replacement (T), Replacement of mitral valve (T), Ascending aortic aneurysm repair.    Postoperative Course/Issues: Prolonged inotropic requirement due to low EF, reported AF> given IV Amio bolus and PO load,  3rd degree HB with narrow QRS.ventricular arrythmia.    R Chest tube with persistent drainage., supratherapeutic INR> recd Vit K 2.5 mg po x 1  Echo > no effusion EF <  20%          Plan:  Encourage coughing, deep breathing and use of incentive spirometry. Wean off supplemental oxygen as able   Continue Aspirin.  lisinopril for reduced EF  Colchicine addtl 24  hrs ^ CT output  D/C Chest tube today ont  colchicine for one week  EPS  following for JR/Ventric arrythmia maintain backup epicardial pacemaker   Probable  Bi-V AICD today>await EPS decision  Continue Coumadin dosing for MVR (T)   Spoke w/ Scott @ Dr Bravo office (pt PCP) will monitor INR as O/P  #[phone]  Replete potassium and magnesium   Protonix for GI prophylaxis.  Continue Lasix/ aldalctone fluid overload, maintain negative fluid balance  BPH on Flomax  Discussed with Dr Carter in AM rounds.

## 2017-02-13 NOTE — DISCHARGE NOTE ADULT - PATIENT PORTAL LINK FT
“You can access the [employer] Patient Portal, offered by [facility], by registering with the following website: [email or web address]”

## 2017-02-14 LAB
ANION GAP SERPL CALC-SCNC: 11 MMOL/L — SIGNIFICANT CHANGE UP (ref 5–17)
BUN SERPL-MCNC: 18 MG/DL — SIGNIFICANT CHANGE UP (ref 8–20)
CALCIUM SERPL-MCNC: 9 MG/DL — SIGNIFICANT CHANGE UP (ref 8.6–10.2)
CHLORIDE SERPL-SCNC: 94 MMOL/L — LOW (ref 98–107)
CO2 SERPL-SCNC: 30 MMOL/L — HIGH (ref 22–29)
CREAT SERPL-MCNC: 0.8 MG/DL — SIGNIFICANT CHANGE UP (ref 0.5–1.3)
GLUCOSE SERPL-MCNC: 119 MG/DL — HIGH (ref 70–115)
INR BLD: 1.4 RATIO — HIGH (ref 0.88–1.16)
MAGNESIUM SERPL-MCNC: 2.1 MG/DL — SIGNIFICANT CHANGE UP (ref 1.8–2.5)
PHOSPHATE SERPL-MCNC: 3.6 MG/DL — SIGNIFICANT CHANGE UP (ref 2.4–4.7)
POTASSIUM SERPL-MCNC: 4.2 MMOL/L — SIGNIFICANT CHANGE UP (ref 3.5–5.3)
POTASSIUM SERPL-SCNC: 4.2 MMOL/L — SIGNIFICANT CHANGE UP (ref 3.5–5.3)
PROTHROM AB SERPL-ACNC: 15.5 SEC — HIGH (ref 10–13.1)
SODIUM SERPL-SCNC: 135 MMOL/L — SIGNIFICANT CHANGE UP (ref 135–145)

## 2017-02-14 PROCEDURE — 93010 ELECTROCARDIOGRAM REPORT: CPT

## 2017-02-14 PROCEDURE — 99232 SBSQ HOSP IP/OBS MODERATE 35: CPT

## 2017-02-14 PROCEDURE — 71020: CPT | Mod: 26

## 2017-02-14 RX ORDER — AMIODARONE HYDROCHLORIDE 400 MG/1
400 TABLET ORAL EVERY 8 HOURS
Qty: 0 | Refills: 0 | Status: DISCONTINUED | OUTPATIENT
Start: 2017-02-14 | End: 2017-02-16

## 2017-02-14 RX ORDER — WARFARIN SODIUM 2.5 MG/1
2 TABLET ORAL ONCE
Qty: 0 | Refills: 0 | Status: COMPLETED | OUTPATIENT
Start: 2017-02-14 | End: 2017-02-14

## 2017-02-14 RX ORDER — MAGNESIUM SULFATE 500 MG/ML
2 VIAL (ML) INJECTION ONCE
Qty: 0 | Refills: 0 | Status: COMPLETED | OUTPATIENT
Start: 2017-02-14 | End: 2017-02-14

## 2017-02-14 RX ORDER — ALBUMIN HUMAN 25 %
250 VIAL (ML) INTRAVENOUS ONCE
Qty: 0 | Refills: 0 | Status: COMPLETED | OUTPATIENT
Start: 2017-02-14 | End: 2017-02-14

## 2017-02-14 RX ORDER — MIDODRINE HYDROCHLORIDE 2.5 MG/1
10 TABLET ORAL EVERY 8 HOURS
Qty: 0 | Refills: 0 | Status: DISCONTINUED | OUTPATIENT
Start: 2017-02-14 | End: 2017-02-16

## 2017-02-14 RX ADMIN — Medication 325 MILLIGRAM(S): at 13:06

## 2017-02-14 RX ADMIN — Medication 100 MILLIGRAM(S): at 13:06

## 2017-02-14 RX ADMIN — AMIODARONE HYDROCHLORIDE 400 MILLIGRAM(S): 400 TABLET ORAL at 06:36

## 2017-02-14 RX ADMIN — WARFARIN SODIUM 2 MILLIGRAM(S): 2.5 TABLET ORAL at 22:16

## 2017-02-14 RX ADMIN — Medication 100 MILLIGRAM(S): at 22:16

## 2017-02-14 RX ADMIN — PANTOPRAZOLE SODIUM 40 MILLIGRAM(S): 20 TABLET, DELAYED RELEASE ORAL at 06:38

## 2017-02-14 RX ADMIN — AMIODARONE HYDROCHLORIDE 400 MILLIGRAM(S): 400 TABLET ORAL at 13:06

## 2017-02-14 RX ADMIN — Medication 50 GRAM(S): at 16:59

## 2017-02-14 RX ADMIN — SODIUM CHLORIDE 3 MILLILITER(S): 9 INJECTION INTRAMUSCULAR; INTRAVENOUS; SUBCUTANEOUS at 13:08

## 2017-02-14 RX ADMIN — MIDODRINE HYDROCHLORIDE 10 MILLIGRAM(S): 2.5 TABLET ORAL at 13:06

## 2017-02-14 RX ADMIN — Medication 40 MILLIGRAM(S): at 06:35

## 2017-02-14 RX ADMIN — MIDODRINE HYDROCHLORIDE 10 MILLIGRAM(S): 2.5 TABLET ORAL at 09:16

## 2017-02-14 RX ADMIN — Medication 125 MILLILITER(S): at 02:19

## 2017-02-14 RX ADMIN — Medication 250 MILLIGRAM(S): at 17:54

## 2017-02-14 RX ADMIN — SODIUM CHLORIDE 3 MILLILITER(S): 9 INJECTION INTRAMUSCULAR; INTRAVENOUS; SUBCUTANEOUS at 21:38

## 2017-02-14 RX ADMIN — TAMSULOSIN HYDROCHLORIDE 0.4 MILLIGRAM(S): 0.4 CAPSULE ORAL at 22:16

## 2017-02-14 RX ADMIN — Medication 100 MILLIGRAM(S): at 06:36

## 2017-02-14 RX ADMIN — AMIODARONE HYDROCHLORIDE 400 MILLIGRAM(S): 400 TABLET ORAL at 22:18

## 2017-02-14 RX ADMIN — Medication 250 MILLIGRAM(S): at 06:35

## 2017-02-14 RX ADMIN — MIDODRINE HYDROCHLORIDE 10 MILLIGRAM(S): 2.5 TABLET ORAL at 22:17

## 2017-02-14 RX ADMIN — Medication 50 GRAM(S): at 05:00

## 2017-02-14 RX ADMIN — SODIUM CHLORIDE 3 MILLILITER(S): 9 INJECTION INTRAMUSCULAR; INTRAVENOUS; SUBCUTANEOUS at 05:24

## 2017-02-14 NOTE — PROGRESS NOTE ADULT - ATTENDING COMMENTS
S/p CRT-defibrillator implant, with His bundle pacing lead.  Overnight had acute ventricular flutter requiring defibrillation, which was successful. VT/VF was too fast for ATP.   Device check was within normal limits. VT episode not apparently related to device implant.  Agree with amiodarone.  Outpatient device followup

## 2017-02-14 NOTE — PROGRESS NOTE ADULT - SUBJECTIVE AND OBJECTIVE BOX
Patient seen and examined today in chair OOB post ICD implant. Overnight course complicated with ICD shock. On device interrogation patient noted to have a episode of ventricular flutter which was successfully converted to SR following ICD shock.     TELE: AS His paced at 78 ppm    MEDICATIONS  (STANDING):  docusate sodium 100milliGRAM(s) Oral three times a day  aspirin enteric coated 325milliGRAM(s) Oral daily  pantoprazole    Tablet 40milliGRAM(s) Oral before breakfast  furosemide    Tablet 40milliGRAM(s) Oral daily  sodium chloride 0.9% lock flush 3milliLiter(s) IV Push every 8 hours  tamsulosin 0.4milliGRAM(s) Oral at bedtime  vancomycin  IVPB 1000milliGRAM(s) IV Intermittent every 12 hours  amiodarone    Tablet 400milliGRAM(s) Oral every 8 hours  midodrine 10milliGRAM(s) Oral every 8 hours    MEDICATIONS  (PRN):  oxyCODONE  5 mG/acetaminophen 325 mG 2Tablet(s) Oral every 6 hours PRN Severe Pain (7 - 10)  oxyCODONE  5 mG/acetaminophen 325 mG 1Tablet(s) Oral every 4 hours PRN Moderate Pain (4 - 6)    Allergies    No Known Allergies    PAST MEDICAL & SURGICAL HISTORY:  Mitral valve insufficiency, unspecified etiology  Aortic valve insufficiency, unspecified etiology  HFrEF (heart failure with reduced ejection fraction)  No significant past surgical history    Vital Signs Last 24 Hrs  T(C): 37, Max: 37.3 (02-14 @ 01:45)  T(F): 98.6, Max: 99.1 (02-14 @ 01:45)  HR: 85 (82 - 300)  BP: 90/56 (86/50 - 121/62)  BP(mean): 66 (63 - 87)  RR: 20 (10 - 30)  SpO2: 94% (90% - 98%)    Physical Exam:  Constitutional: NAD, AAOx3  Cardiovascular: +S1S2 RRR  Pulmonary: CTA b/l, unlabored  GI: soft NTND +BS  Extremities: no pedal edema, +distal pulses b/l  Neuro: non focal, ROTHMAN x4    LABS:                        9.5    9.65  )-----------( 349      ( 13 Feb 2017 23:31 )             30.4     13 Feb 2017 23:31    134    |  94     |  18.0   ----------------------------<  100    4.3     |  30.0   |  0.78     Ca    8.4        13 Feb 2017 23:31  Phos  3.9       13 Feb 2017 23:31  Mg     1.9       13 Feb 2017 23:31    PT/INR - ( 13 Feb 2017 23:31 )   PT: 17.3 sec;   INR: 1.57 ratio      A/P  61 year old male who is s/p AVR/MVR, with severe LV dysfunction. He had postoperative 3rd degree heart block with narrow QRS. Patient now s/p successful His ICD implant. Patient with a episode of ventricular flutter with successful ICD shock overnight.  - CXR pending this AM  - EKG: A sensed partial His paced.   - follow up with Dr. Thomas in 2 weeks on 3/1/17 at 1:30PM  - Continue load with Amiodarone  - Observe on telemetry overnight.

## 2017-02-14 NOTE — PROGRESS NOTE ADULT - SUBJECTIVE AND OBJECTIVE BOX
INTERVAL HISTORY:  Had AICD inserted yesterday with His pacing.  Last night had ventricular flutter with an appropriate shock.  On Amiodarone loading    	  MEDICATIONS:  furosemide    Tablet 40milliGRAM(s) Oral daily  tamsulosin 0.4milliGRAM(s) Oral at bedtime  amiodarone    Tablet 400milliGRAM(s) Oral every 8 hours  midodrine 10milliGRAM(s) Oral every 8 hours        oxyCODONE  5 mG/acetaminophen 325 mG 2Tablet(s) Oral every 6 hours PRN  oxyCODONE  5 mG/acetaminophen 325 mG 1Tablet(s) Oral every 4 hours PRN    docusate sodium 100milliGRAM(s) Oral three times a day  pantoprazole    Tablet 40milliGRAM(s) Oral before breakfast      aspirin enteric coated 325milliGRAM(s) Oral daily  warfarin 2milliGRAM(s) Oral once        PHYSICAL EXAM:    T(C): 37.1, Max: 37.3 (02-14 @ 01:45)  HR: 72 (68 - 300)  BP: 118/73 (86/50 - 121/62)  RR: 15 (10 - 30)  SpO2: 97% (90% - 98%)  Wt(kg): --    I&O's Summary  I & Os for 24h ending 14 Feb 2017 07:00  =============================================  IN: 750 ml / OUT: 1225 ml / NET: -475 ml    I & Os for current day (as of 14 Feb 2017 20:31)  =============================================  IN: 1090 ml / OUT: 2000 ml / NET: -910 ml      Daily     Daily     Appearance: Normal	  HEENT:   Normal oral mucosa, PERRL, EOMI	  Lymphatic: No lymphadenopathy  Cardiovascular: Normal S1 S2, No JVD, No murmurs, No edema  Respiratory: Lungs clear to auscultation	  Psychiatry: A & O x 3, Mood & affect appropriate  Gastrointestinal:  Soft, Non-tender, + BS	  Skin: No rashes, No ecchymoses, No cyanosis  Neurologic: Non-focal  Extremities: Normal range of motion, No clubbing, cyanosis or edema  Vascular: Peripheral pulses palpable 2+ bilaterally  Procedure Site:                          9.5    9.65  )-----------( 349      ( 13 Feb 2017 23:31 )             30.4     14 Feb 2017 17:22    135    |  94     |  18.0   ----------------------------<  119    4.2     |  30.0   |  0.80     Ca    9.0        14 Feb 2017 17:22  Phos  3.6       14 Feb 2017 17:22  Mg     2.1       14 Feb 2017 17:22      ASSESSMENT/PLAN: 	    S/p bioprosthetic MVR/AVR/ ascending aorta repair with low EF  S/P AICD with His pacing yesterday and had ventricular flutter last night successfully cardioverted with the device  Continue Amio load as per EP  Continue to monitor closely.

## 2017-02-14 NOTE — PROGRESS NOTE ADULT - SUBJECTIVE AND OBJECTIVE BOX
Recent events: called to see pt just after 2230hrs on 2/13/17 after pt noted to have sustained VT which ended in his ICD firing on telemetry.    Subjective: immediately post episode pt tired and c/o feeling shock.  denies c/o at this time. Denies CP, SOB, palpitations, dizziness, N/V, other c/o.    T(C): 37.3, Max: 37.3 (02-14 @ 01:45)  HR: 87 (87 - 300), paced  BP: 95/56 (86/50 - 121/62)  RR: 10 (10 - 29)  SpO2: 95% (90% - 99%) on room air  CI: 3.5 (3.5 - 3.5) [non-invasive via maricarmen trac]        I&O's Detail  I & Os for 24h ending 13 Feb 2017 07:00  =============================================  Total IN: 900 ml  OUT:    Voided: 2550 ml    Chest Tube: 180 ml  Total OUT: 2730 ml  ---------------------------------------------  Total NET: -1830 ml    I & Os for current day (as of 14 Feb 2017 03:46)  =============================================  Total IN: 450 ml  Total OUT: 875 ml (voided)  ---------------------------------------------  Total NET: -425 ml        13 Feb 2017 23:31    134    |  94     |  18.0   ----------------------------<  100    4.3     |  30.0   |  0.78     Ca    8.4        13 Feb 2017 23:31  Phos  3.9       13 Feb 2017 23:31  Mg     1.9       13 Feb 2017 23:31                            9.5    9.65  )-----------( 349      ( 13 Feb 2017 23:31 )             30.4     PT/INR - ( 13 Feb 2017 23:31 )   PT: 17.3 sec;   INR: 1.57 ratio        2/13/17 CXR: IMPRESSION: Status post right-sided chest tube removal. Stable tiny right apical pneumothorax.  2/14/17 CXR: pending      MEDICATIONS  (STANDING):  docusate sodium 100milliGRAM(s) Oral three times a day  aspirin enteric coated 325milliGRAM(s) Oral daily  pantoprazole    Tablet 40milliGRAM(s) Oral before breakfast  furosemide    Tablet 40milliGRAM(s) Oral daily  sodium chloride 0.9% lock flush 3milliLiter(s) IV Push every 8 hours  tamsulosin 0.4milliGRAM(s) Oral at bedtime  sorbitol 70% Solution 30milliLiter(s) Oral once  vancomycin  IVPB 1000milliGRAM(s) IV Intermittent every 12 hours  amiodarone    Tablet 400milliGRAM(s) Oral every 8 hours    MEDICATIONS  (PRN):  oxyCODONE  5 mG/acetaminophen 325 mG 2Tablet(s) Oral every 6 hours PRN Severe Pain (7 - 10)  oxyCODONE  5 mG/acetaminophen 325 mG 1Tablet(s) Oral every 4 hours PRN Moderate Pain (4 - 6)      Physical Exam  Neuro: A+O x 3, non-focal, speech clear and intact  Pulm: CTA, equal bilaterally  CV: regular, +S1S2  Abd: soft, NT, ND, +BS  Ext: ROTHMAN x 4, no edema  Inc: MSI C/D/I/stable w/ dsg, L ACW C/D/I with dsg

## 2017-02-14 NOTE — PROGRESS NOTE ADULT - ASSESSMENT
61 year old male with former smoker with no significant past medical history who went to [facility] on 1/21 for rapidly progressing SOB and occasional chest pain. Tx to [facility] for further work-up of acute systolic heart failure. normal coronaries on left heart cath. 2/3 chest CT revealed an ascending aortic aneurysm. Mod-severe AI and severe MR on JARVIS. 2/6 s/p AVR (T), MVR (T), ascending aortic aneurysm repair. Post-op course c/b multiple rhythm disturbances including PAF, CHB, junctional, and NSVT. 2/13 Right pigtail for recurrent/persistent pleural effusion d/c'd, s/p ICD insertion (Medtronic). now s/p sustained VT resulting in ICD firing.    Plan  lidocaine 100mg IVP x 1  amiodarone 150mg IVPB x 1 then start 10g PO load  transferred to CTU for further monitoring with R2 pads in place and connected to defibrillator  hemodynamically stable after event but with mild hypotension upon arrival to CTU   lopressor, lisinopril and aldactone all d/c'd  stat TTE ordered (prelim reading is no pericardial effusion, EF remains <20%)  non-invasive maricarmen trac monitoring ordered  albumin bolus 250cc x 1   post ICD care per EP service  above events/plan reviewed and agreed to by Pricila Carter and Martha  continue tele monitoring  stable post-op acute blood loss anemia  replete magnesium  further plan of care to be discussed on am rounds with CT Surgical team

## 2017-02-15 DIAGNOSIS — I47.2 VENTRICULAR TACHYCARDIA: ICD-10-CM

## 2017-02-15 DIAGNOSIS — Z98.890 OTHER SPECIFIED POSTPROCEDURAL STATES: ICD-10-CM

## 2017-02-15 DIAGNOSIS — Z95.0 PRESENCE OF CARDIAC PACEMAKER: ICD-10-CM

## 2017-02-15 DIAGNOSIS — J90 PLEURAL EFFUSION, NOT ELSEWHERE CLASSIFIED: ICD-10-CM

## 2017-02-15 LAB
ANION GAP SERPL CALC-SCNC: 11 MMOL/L — SIGNIFICANT CHANGE UP (ref 5–17)
BUN SERPL-MCNC: 20 MG/DL — SIGNIFICANT CHANGE UP (ref 8–20)
CALCIUM SERPL-MCNC: 8.6 MG/DL — SIGNIFICANT CHANGE UP (ref 8.6–10.2)
CHLORIDE SERPL-SCNC: 95 MMOL/L — LOW (ref 98–107)
CO2 SERPL-SCNC: 28 MMOL/L — SIGNIFICANT CHANGE UP (ref 22–29)
CREAT SERPL-MCNC: 0.61 MG/DL — SIGNIFICANT CHANGE UP (ref 0.5–1.3)
GLUCOSE SERPL-MCNC: 108 MG/DL — SIGNIFICANT CHANGE UP (ref 70–115)
HCT VFR BLD CALC: 29.1 % — LOW (ref 42–52)
HGB BLD-MCNC: 9.3 G/DL — LOW (ref 14–18)
INR BLD: 1.36 RATIO — HIGH (ref 0.88–1.16)
MAGNESIUM SERPL-MCNC: 2 MG/DL — SIGNIFICANT CHANGE UP (ref 1.8–2.5)
MCHC RBC-ENTMCNC: 28.2 PG — SIGNIFICANT CHANGE UP (ref 27–31)
MCHC RBC-ENTMCNC: 32 G/DL — SIGNIFICANT CHANGE UP (ref 32–36)
MCV RBC AUTO: 88.2 FL — SIGNIFICANT CHANGE UP (ref 80–94)
PLATELET # BLD AUTO: 298 K/UL — SIGNIFICANT CHANGE UP (ref 150–400)
POTASSIUM SERPL-MCNC: 4 MMOL/L — SIGNIFICANT CHANGE UP (ref 3.5–5.3)
POTASSIUM SERPL-SCNC: 4 MMOL/L — SIGNIFICANT CHANGE UP (ref 3.5–5.3)
PROTHROM AB SERPL-ACNC: 15 SEC — HIGH (ref 10–13.1)
RBC # BLD: 3.3 M/UL — LOW (ref 4.6–6.2)
RBC # FLD: 14.8 % — SIGNIFICANT CHANGE UP (ref 11–15.6)
SODIUM SERPL-SCNC: 134 MMOL/L — LOW (ref 135–145)
WBC # BLD: 9.5 K/UL — SIGNIFICANT CHANGE UP (ref 4.8–10.8)
WBC # FLD AUTO: 9.5 K/UL — SIGNIFICANT CHANGE UP (ref 4.8–10.8)

## 2017-02-15 PROCEDURE — 71010: CPT | Mod: 26

## 2017-02-15 PROCEDURE — 93010 ELECTROCARDIOGRAM REPORT: CPT

## 2017-02-15 RX ORDER — WARFARIN SODIUM 2.5 MG/1
2 TABLET ORAL ONCE
Qty: 0 | Refills: 0 | Status: COMPLETED | OUTPATIENT
Start: 2017-02-15 | End: 2017-02-15

## 2017-02-15 RX ORDER — POTASSIUM CHLORIDE 20 MEQ
20 PACKET (EA) ORAL ONCE
Qty: 0 | Refills: 0 | Status: COMPLETED | OUTPATIENT
Start: 2017-02-15 | End: 2017-02-15

## 2017-02-15 RX ADMIN — MIDODRINE HYDROCHLORIDE 10 MILLIGRAM(S): 2.5 TABLET ORAL at 13:45

## 2017-02-15 RX ADMIN — Medication 40 MILLIGRAM(S): at 06:37

## 2017-02-15 RX ADMIN — Medication 20 MILLIEQUIVALENT(S): at 06:37

## 2017-02-15 RX ADMIN — SODIUM CHLORIDE 3 MILLILITER(S): 9 INJECTION INTRAMUSCULAR; INTRAVENOUS; SUBCUTANEOUS at 12:29

## 2017-02-15 RX ADMIN — AMIODARONE HYDROCHLORIDE 400 MILLIGRAM(S): 400 TABLET ORAL at 06:37

## 2017-02-15 RX ADMIN — AMIODARONE HYDROCHLORIDE 400 MILLIGRAM(S): 400 TABLET ORAL at 13:44

## 2017-02-15 RX ADMIN — TAMSULOSIN HYDROCHLORIDE 0.4 MILLIGRAM(S): 0.4 CAPSULE ORAL at 21:18

## 2017-02-15 RX ADMIN — PANTOPRAZOLE SODIUM 40 MILLIGRAM(S): 20 TABLET, DELAYED RELEASE ORAL at 06:37

## 2017-02-15 RX ADMIN — WARFARIN SODIUM 2 MILLIGRAM(S): 2.5 TABLET ORAL at 21:18

## 2017-02-15 RX ADMIN — Medication 100 MILLIGRAM(S): at 13:44

## 2017-02-15 RX ADMIN — Medication 100 MILLIGRAM(S): at 06:37

## 2017-02-15 RX ADMIN — AMIODARONE HYDROCHLORIDE 400 MILLIGRAM(S): 400 TABLET ORAL at 21:18

## 2017-02-15 RX ADMIN — SODIUM CHLORIDE 3 MILLILITER(S): 9 INJECTION INTRAMUSCULAR; INTRAVENOUS; SUBCUTANEOUS at 21:17

## 2017-02-15 RX ADMIN — MIDODRINE HYDROCHLORIDE 10 MILLIGRAM(S): 2.5 TABLET ORAL at 06:37

## 2017-02-15 RX ADMIN — Medication 100 MILLIGRAM(S): at 21:18

## 2017-02-15 RX ADMIN — MIDODRINE HYDROCHLORIDE 10 MILLIGRAM(S): 2.5 TABLET ORAL at 21:18

## 2017-02-15 RX ADMIN — SODIUM CHLORIDE 3 MILLILITER(S): 9 INJECTION INTRAMUSCULAR; INTRAVENOUS; SUBCUTANEOUS at 05:13

## 2017-02-15 RX ADMIN — Medication 325 MILLIGRAM(S): at 12:32

## 2017-02-15 NOTE — PROGRESS NOTE ADULT - PROBLEM SELECTOR PROBLEM 3
Aortic valve insufficiency, unspecified etiology
Aortic valve insufficiency, unspecified etiology
Mitral valve insufficiency, unspecified etiology
Mitral valve insufficiency, unspecified etiology
Nonischemic cardiomyopathy
S/P biventricular cardiac pacemaker procedure

## 2017-02-15 NOTE — PROGRESS NOTE ADULT - PROBLEM SELECTOR PROBLEM 4
Mitral valve insufficiency, unspecified etiology
Mitral valve insufficiency, unspecified etiology
S/P ascending aortic aneurysm repair

## 2017-02-15 NOTE — PROGRESS NOTE ADULT - PROBLEM SELECTOR PROBLEM 1
Acute systolic heart failure
Nonischemic cardiomyopathy
Acute on chronic systolic heart failure
Acute systolic heart failure
Nonischemic cardiomyopathy
Nonischemic cardiomyopathy

## 2017-02-15 NOTE — PROGRESS NOTE ADULT - ASSESSMENT
61 year old male with former smoker with no significant past medical history who went to [facility] on 1/21 for rapidly progressing SOB and occasional chest pain. Tx to [facility] for further work-up of acute systolic heart failure. normal coronaries on left heart cath. 2/3 chest CT revealed an ascending aortic aneurysm. Mod-severe AI and severe MR on JARVIS. 2/6 s/p AVR (T), MVR (T), ascending aortic aneurysm repair. Post-op course c/b multiple rhythm disturbances including PAF, CHB, junctional, and NSVT. 2/13 Right pigtail for recurrent/persistent pleural effusion d/c'd, s/p BiV ICD insertion (Medtronic). Episode of VT yesterday requiring ICD shock.      Plan  lidocaine 100mg IVP x 1  amiodarone 150mg IVPB x 1 then start 10g PO load  transferred to CTU for further monitoring with R2 pads in place and connected to defibrillator  hemodynamically stable after event but with mild hypotension upon arrival to CTU   lopressor, lisinopril and aldactone all d/c'd  stat TTE ordered (prelim reading is no pericardial effusion, EF remains <20%)  non-invasive maricarmen trac monitoring ordered  albumin bolus 250cc x 1   post ICD care per EP service  above events/plan reviewed and agreed to by Pricila Carter and Martha  continue tele monitoring  stable post-op acute blood loss anemia  replete magnesium  further plan of care to be discussed on am rounds with CT Surgical team

## 2017-02-15 NOTE — PROGRESS NOTE ADULT - PROBLEM SELECTOR PLAN 1
Low ef 15- 20% , cardiology recommend coreg , ACE, low dose lasix.
low dose lasix 20mg daily   Metoprolol 12.5mg bid
-s/p IV lasix, doing well
1. Continue with lasix  2. Continue with metoprolol
1. Continue with lasix  2. Continue with metoprolol.
Continue lasix daily, monitor for symptoms of heart failure.

## 2017-02-15 NOTE — PROGRESS NOTE ADULT - SUBJECTIVE AND OBJECTIVE BOX
61y Male s/p Aortic valve replacement  Replacement of mitral valve  Ascending aortic aneurysm repair      Subjective: Pt without complaints.  Temp epicardial pacing wire removed last night.      T(C): 37, Max: 37.1 (02-14 @ 15:00)  HR: 67 (67 - 86)  BP: 105/66 (90/56 - 120/76)  ABP: 0/0 (0/0 - 124/69)  ABP(mean): 0 (0 - 84)  RR: 12 (12 - 30)  SpO2: 95% (94% - 98%)  Wt(kg): --  CVP(mm Hg): --  CO: 6 (5.2 - 6)  CI: 3.2 (2.8 - 3.2)  PA: --         15 Feb 2017 03:33    134    |  95     |  20.0   ----------------------------<  108    4.0     |  28.0   |  0.61     Ca    8.6        15 Feb 2017 03:33  Phos  3.6       14 Feb 2017 17:22  Mg     2.0       15 Feb 2017 03:33                                 9.3    9.5   )-----------( 298      ( 15 Feb 2017 03:33 )             29.1        PT/INR - ( 14 Feb 2017 16:19 )   PT: 15.5 sec;   INR: 1.40 ratio                      CAPILLARY BLOOD GLUCOSE           CXR:    I&O's Detail  I & Os for 24h ending 14 Feb 2017 07:00  =============================================  IN:    Albumin 5% - 50 mL: 250 ml    Solution: 250 ml    Solution: 100 ml    Solution: 100 ml    Oral Fluid: 50 ml    Total IN: 750 ml  ---------------------------------------------  OUT:    Voided: 1225 ml    Total OUT: 1225 ml  ---------------------------------------------  Total NET: -475 ml    I & Os for current day (as of 15 Feb 2017 04:15)  =============================================  IN:    Oral Fluid: 1040 ml    Solution: 250 ml    Total IN: 1290 ml  ---------------------------------------------  OUT:    Voided: 2350 ml    Total OUT: 2350 ml  ---------------------------------------------  Total NET: -1060 ml      MEDICATIONS  (STANDING):  docusate sodium 100milliGRAM(s) Oral three times a day  aspirin enteric coated 325milliGRAM(s) Oral daily  pantoprazole    Tablet 40milliGRAM(s) Oral before breakfast  furosemide    Tablet 40milliGRAM(s) Oral daily  sodium chloride 0.9% lock flush 3milliLiter(s) IV Push every 8 hours  tamsulosin 0.4milliGRAM(s) Oral at bedtime  midodrine 10milliGRAM(s) Oral every 8 hours  amiodarone    Tablet 400milliGRAM(s) Oral every 8 hours    MEDICATIONS  (PRN):  oxyCODONE  5 mG/acetaminophen 325 mG 2Tablet(s) Oral every 6 hours PRN Severe Pain (7 - 10)  oxyCODONE  5 mG/acetaminophen 325 mG 1Tablet(s) Oral every 4 hours PRN Moderate Pain (4 - 6)      Physical Exam  Neuro: A+O x 3, non-focal  Pulm: CTA, equal bilaterally  CV: RRR, +S1S2  Abd: soft, NT, ND, +BS  Ext: 2+ edema  Inc: MSI C/D/I/

## 2017-02-16 LAB
ANION GAP SERPL CALC-SCNC: 11 MMOL/L — SIGNIFICANT CHANGE UP (ref 5–17)
APTT BLD: 41.8 SEC — HIGH (ref 27.5–37.4)
BUN SERPL-MCNC: 14 MG/DL — SIGNIFICANT CHANGE UP (ref 8–20)
CALCIUM SERPL-MCNC: 8.8 MG/DL — SIGNIFICANT CHANGE UP (ref 8.6–10.2)
CHLORIDE SERPL-SCNC: 94 MMOL/L — LOW (ref 98–107)
CO2 SERPL-SCNC: 29 MMOL/L — SIGNIFICANT CHANGE UP (ref 22–29)
CREAT SERPL-MCNC: 0.74 MG/DL — SIGNIFICANT CHANGE UP (ref 0.5–1.3)
GLUCOSE SERPL-MCNC: 99 MG/DL — SIGNIFICANT CHANGE UP (ref 70–115)
HCT VFR BLD CALC: 29.7 % — LOW (ref 42–52)
HGB BLD-MCNC: 9.6 G/DL — LOW (ref 14–18)
INR BLD: 1.49 RATIO — HIGH (ref 0.88–1.16)
MAGNESIUM SERPL-MCNC: 1.7 MG/DL — LOW (ref 1.8–2.5)
MCHC RBC-ENTMCNC: 28.6 PG — SIGNIFICANT CHANGE UP (ref 27–31)
MCHC RBC-ENTMCNC: 32.3 G/DL — SIGNIFICANT CHANGE UP (ref 32–36)
MCV RBC AUTO: 88.4 FL — SIGNIFICANT CHANGE UP (ref 80–94)
PLATELET # BLD AUTO: 324 K/UL — SIGNIFICANT CHANGE UP (ref 150–400)
POTASSIUM SERPL-MCNC: 4 MMOL/L — SIGNIFICANT CHANGE UP (ref 3.5–5.3)
POTASSIUM SERPL-SCNC: 4 MMOL/L — SIGNIFICANT CHANGE UP (ref 3.5–5.3)
PROTHROM AB SERPL-ACNC: 16.5 SEC — HIGH (ref 10–13.1)
RBC # BLD: 3.36 M/UL — LOW (ref 4.6–6.2)
RBC # FLD: 14.5 % — SIGNIFICANT CHANGE UP (ref 11–15.6)
SODIUM SERPL-SCNC: 134 MMOL/L — LOW (ref 135–145)
WBC # BLD: 7.2 K/UL — SIGNIFICANT CHANGE UP (ref 4.8–10.8)
WBC # FLD AUTO: 7.2 K/UL — SIGNIFICANT CHANGE UP (ref 4.8–10.8)

## 2017-02-16 PROCEDURE — 71010: CPT | Mod: 26

## 2017-02-16 PROCEDURE — 93010 ELECTROCARDIOGRAM REPORT: CPT

## 2017-02-16 RX ORDER — WARFARIN SODIUM 2.5 MG/1
3 TABLET ORAL ONCE
Qty: 0 | Refills: 0 | Status: COMPLETED | OUTPATIENT
Start: 2017-02-16 | End: 2017-02-16

## 2017-02-16 RX ORDER — MAGNESIUM SULFATE 500 MG/ML
2 VIAL (ML) INJECTION ONCE
Qty: 0 | Refills: 0 | Status: COMPLETED | OUTPATIENT
Start: 2017-02-16 | End: 2017-02-16

## 2017-02-16 RX ORDER — POTASSIUM CHLORIDE 20 MEQ
20 PACKET (EA) ORAL ONCE
Qty: 0 | Refills: 0 | Status: COMPLETED | OUTPATIENT
Start: 2017-02-16 | End: 2017-02-16

## 2017-02-16 RX ORDER — AMIODARONE HYDROCHLORIDE 400 MG/1
400 TABLET ORAL EVERY 12 HOURS
Qty: 0 | Refills: 0 | Status: DISCONTINUED | OUTPATIENT
Start: 2017-02-16 | End: 2017-02-17

## 2017-02-16 RX ORDER — ENOXAPARIN SODIUM 100 MG/ML
40 INJECTION SUBCUTANEOUS ONCE
Qty: 0 | Refills: 0 | Status: COMPLETED | OUTPATIENT
Start: 2017-02-16 | End: 2017-02-16

## 2017-02-16 RX ORDER — METOPROLOL TARTRATE 50 MG
12.5 TABLET ORAL
Qty: 0 | Refills: 0 | Status: DISCONTINUED | OUTPATIENT
Start: 2017-02-16 | End: 2017-02-17

## 2017-02-16 RX ORDER — LISINOPRIL 2.5 MG/1
2.5 TABLET ORAL DAILY
Qty: 0 | Refills: 0 | Status: DISCONTINUED | OUTPATIENT
Start: 2017-02-16 | End: 2017-02-17

## 2017-02-16 RX ORDER — MAGNESIUM SULFATE 500 MG/ML
2 VIAL (ML) INJECTION ONCE
Qty: 0 | Refills: 0 | Status: DISCONTINUED | OUTPATIENT
Start: 2017-02-16 | End: 2017-02-16

## 2017-02-16 RX ADMIN — SODIUM CHLORIDE 3 MILLILITER(S): 9 INJECTION INTRAMUSCULAR; INTRAVENOUS; SUBCUTANEOUS at 13:31

## 2017-02-16 RX ADMIN — TAMSULOSIN HYDROCHLORIDE 0.4 MILLIGRAM(S): 0.4 CAPSULE ORAL at 21:03

## 2017-02-16 RX ADMIN — WARFARIN SODIUM 3 MILLIGRAM(S): 2.5 TABLET ORAL at 21:03

## 2017-02-16 RX ADMIN — SODIUM CHLORIDE 3 MILLILITER(S): 9 INJECTION INTRAMUSCULAR; INTRAVENOUS; SUBCUTANEOUS at 21:05

## 2017-02-16 RX ADMIN — Medication 100 MILLIGRAM(S): at 05:55

## 2017-02-16 RX ADMIN — MIDODRINE HYDROCHLORIDE 10 MILLIGRAM(S): 2.5 TABLET ORAL at 13:27

## 2017-02-16 RX ADMIN — Medication 50 GRAM(S): at 08:28

## 2017-02-16 RX ADMIN — AMIODARONE HYDROCHLORIDE 400 MILLIGRAM(S): 400 TABLET ORAL at 05:55

## 2017-02-16 RX ADMIN — Medication 100 MILLIGRAM(S): at 21:03

## 2017-02-16 RX ADMIN — AMIODARONE HYDROCHLORIDE 400 MILLIGRAM(S): 400 TABLET ORAL at 18:24

## 2017-02-16 RX ADMIN — Medication 100 MILLIGRAM(S): at 13:26

## 2017-02-16 RX ADMIN — Medication 325 MILLIGRAM(S): at 13:27

## 2017-02-16 RX ADMIN — PANTOPRAZOLE SODIUM 40 MILLIGRAM(S): 20 TABLET, DELAYED RELEASE ORAL at 05:55

## 2017-02-16 RX ADMIN — MIDODRINE HYDROCHLORIDE 10 MILLIGRAM(S): 2.5 TABLET ORAL at 05:55

## 2017-02-16 RX ADMIN — Medication 20 MILLIEQUIVALENT(S): at 13:26

## 2017-02-16 RX ADMIN — SODIUM CHLORIDE 3 MILLILITER(S): 9 INJECTION INTRAMUSCULAR; INTRAVENOUS; SUBCUTANEOUS at 05:55

## 2017-02-16 RX ADMIN — ENOXAPARIN SODIUM 40 MILLIGRAM(S): 100 INJECTION SUBCUTANEOUS at 13:27

## 2017-02-16 RX ADMIN — Medication 40 MILLIGRAM(S): at 05:55

## 2017-02-16 NOTE — PROGRESS NOTE ADULT - SUBJECTIVE AND OBJECTIVE BOX
INTERVAL HISTORY:  Looks and feels well  No further arrhythmias    	  MEDICATIONS:  furosemide    Tablet 40milliGRAM(s) Oral daily  tamsulosin 0.4milliGRAM(s) Oral at bedtime  midodrine 10milliGRAM(s) Oral every 8 hours  amiodarone    Tablet 400milliGRAM(s) Oral every 12 hours        oxyCODONE  5 mG/acetaminophen 325 mG 1Tablet(s) Oral every 4 hours PRN    docusate sodium 100milliGRAM(s) Oral three times a day  pantoprazole    Tablet 40milliGRAM(s) Oral before breakfast      aspirin enteric coated 325milliGRAM(s) Oral daily  warfarin 3milliGRAM(s) Oral once        PHYSICAL EXAM:    T(C): 36.8, Max: 36.9 (-16 @ 08:00)  HR: 80 (73 - 80)  BP: 112/78 (98/60 - 116/68)  RR: 15 (15 - 18)  SpO2: 95% (94% - 96%)  Wt(kg): --    I&O's Summary  I & Os for 24h ending 2017 07:00  =============================================  IN: 120 ml / OUT: 0 ml / NET: 120 ml    I & Os for current day (as of 2017 20:19)  =============================================  IN: 770 ml / OUT: 0 ml / NET: 770 ml      Daily     Daily Weight in k.1 (2017 05:40)    Appearance: Normal	  HEENT:   Normal oral mucosa, PERRL, EOMI	  Lymphatic: No lymphadenopathy  Cardiovascular: Normal S1 S2, No JVD, 1-2/6 systolic murmur, No edema  Respiratory: Lungs clear to auscultation	  Psychiatry: A & O x 3, Mood & affect appropriate  Gastrointestinal:  Soft, Non-tender, + BS	  Skin: No rashes, No ecchymoses, No cyanosis  Neurologic: Non-focal  Extremities: Normal range of motion, No clubbing, cyanosis or edema  Vascular: Peripheral pulses palpable 2+ bilaterally  Procedure Site:                          9.6    7.2   )-----------( 324      ( 2017 04:59 )             29.7     2017 04:59    134    |  94     |  14.0   ----------------------------<  99     4.0     |  29.0   |  0.74     Ca    8.8        2017 04:59  Mg     1.7       2017 04:59      proBNP:   Lipid Profile:   HgA1c:     ASSESSMENT/PLAN: 	  Will discontinue Midodrine  Will place on low dose beta blockers  Amiodarone maintenance after loading dose is complete.

## 2017-02-16 NOTE — PROGRESS NOTE ADULT - SUBJECTIVE AND OBJECTIVE BOX
Subjective:     VITAL SIGNS  Vital Signs Last 24 Hrs  T(C): 36.9, Max: 36.9 ( @ 08:00)  T(F): 98.5, Max: 98.5 ( @ 08:00)  HR: 74 (70 - 80)  BP: 98/60 (90/52 - 118/58)  RR: 15 (15 - 18)  SpO2: 94% (94% - 98%) RA             Telemetry/Alarms:  A sens V paced 70s  LVEF: < 20%    MEDICATIONS  docusate sodium 100milliGRAM(s) Oral three times a day  aspirin enteric coated 325milliGRAM(s) Oral daily  pantoprazole    Tablet 40milliGRAM(s) Oral before breakfast  furosemide    Tablet 40milliGRAM(s) Oral daily  sodium chloride 0.9% lock flush 3milliLiter(s) IV Push every 8 hours  tamsulosin 0.4milliGRAM(s) Oral at bedtime  midodrine 10milliGRAM(s) Oral every 8 hours  amiodarone    Tablet 400milliGRAM(s) Oral every 8 hours  oxyCODONE  5 mG/acetaminophen 325 mG 1Tablet(s) Oral every 4 hours PRN  enoxaparin Injectable 40milliGRAM(s) SubCutaneous once  warfarin 3milliGRAM(s) Oral once  potassium chloride    Tablet ER 20milliEquivalent(s) Oral once      PHYSICAL EXAM  General: well nourished, well developed, no acute distress  Neurology: alert and oriented x 3, nonfocal, no gross deficits  Respiratory: clear to auscultation bilaterally  CV: regular rate and rhythm, normal S1, S2  Abdomen: soft, nontender, nondistended, positive bowel sounds, last bowel movement   Extremities: warm, well perfused. no edema. + DP pulses  Incisions: midline sternal incision, + mepilex, c/d/i. sternum stable.  Chest tubes:   Epicardial Wires:    > EPM (settings) / isolated    I & Os for 24h ending  @ 07:00  =============================================  IN: 120 ml / OUT: 0 ml / NET: 120 ml    I & Os for current day (as of  @ 09:54)  =============================================  IN: 290 ml / OUT: 0 ml / NET: 290 ml      Weights:  Daily     Daily Weight in k.1 (2017 05:40)  Admit Wt: Drug Dosing Weight  Height (cm): 167 (2017 07:40)  Weight (kg): 76.8 (2017 07:21)  BMI (kg/m2): 27.5 (2017 07:21)  BSA (m2): 1.86 (2017 07:21)    LABS  2017 04:59    134    |  94     |  14.0   ----------------------------<  99     4.0     |  29.0   |  0.74     Ca    8.8        2017 04:59  Phos  3.6       2017 17:22  Mg     1.7       2017 04:59                                   9.6    7.2   )-----------( 324      ( 2017 04:59 )             29.7          PT/INR - ( 2017 04:59 )   PT: 16.5 sec;   INR: 1.49 ratio         PTT - ( 2017 04:59 )  PTT:41.8 sec         Today's CXR: B/L atelectasis    Today's EKG: A sens V paced 76, QTc 611    TTE   Summary:   1. Mildly enlarged left atrium.   2. Global diffuse akinesis. Paradoxical septal motion consistent with   post-operative status. Left ventricular ejection fraction, by visual   estimation, is <20%.   3. The right atrium is normal in size.   4. The right ventricular size is normal. Moderately reduced RV systolic   function.   5. Mitral prosthesis is functioning normally.   6. Aortic valve is normally functioning bioprosthetic valve.   7. There is no evidence of pericardial effusion.    PAST MEDICAL & SURGICAL HISTORY:  Mitral valve insufficiency, unspecified etiology  Aortic valve insufficiency, unspecified etiology  HFrEF (heart failure with reduced ejection fraction)  No significant past surgical history      ASSESSMENT  61 year old male with former smoker with no significant past medical history who went to [facility] on  for rapidly progressing SOB and occasional chest pain. Tx to [facility] on  for further work-up of acute systolic heart failure. normal coronaries on left heart cath. 2/3 chest CT revealed an ascending aortic aneurysm. Mod-severe AI and severe MR on JARVIS.  s/p AVR (T), MVR (T), ascending aortic aneurysm repair.     Post-op course c/b multiple rhythm disturbances including PAF, CHB, junctional, and NSVT.  Right pigtail for recurrent/persistent pleural effusion d/c'd, s/p BiV ICD insertion (Medtronic).  Episode of VT requiring ICD shock - transferred to ICU, lidocaine given x 1, amiodarone load started. Albumin/midodrine required for hypotension. Transferred back to floor 2/15.    Plan  Pain control with Percocet prn  Continuous telemetry.   Continue amiodarone PO load - modify when discharged  Continue Coumadin for MVR - INR 1.49 today, will order 3 mg.   Continue midodrine for hypotension. Lopressor, lisinopril and aldactone all d/c'd  ICD care per EP, can shower five days postop. Completed antibiotics.  Atelectasis on chest xray - needs to ambulate, chest PT, incentive spirometry. Check daily chest xray.  Continue stool softeners, Protonix for GI prophylaxis  Resume Lovenox today for DVT prophylaxis  Stable post-op acute blood loss anemia  Replete magnesium, potassium  PT as tolerated, ambulate TID  Plan for D/C home tomorrow  Echo tomorrow if INR therapeutic?  Discussed with Dr. Carter and CTS team in AM Subjective: "Better today than yesterday." Pt seen with telephone , #[phone]. seated at edge of bed, nad    VITAL SIGNS  Vital Signs Last 24 Hrs  T(C): 36.9, Max: 36.9 ( @ 08:00)  T(F): 98.5, Max: 98.5 ( @ 08:00)  HR: 74 (70 - 80)  BP: 98/60 (90/52 - 118/58)  RR: 15 (15 - 18)  SpO2: 94% (94% - 98%) RA             Telemetry/Alarms:  A sens V paced 70s  LVEF: < 20%    MEDICATIONS  docusate sodium 100milliGRAM(s) Oral three times a day  aspirin enteric coated 325milliGRAM(s) Oral daily  pantoprazole    Tablet 40milliGRAM(s) Oral before breakfast  furosemide    Tablet 40milliGRAM(s) Oral daily  sodium chloride 0.9% lock flush 3milliLiter(s) IV Push every 8 hours  tamsulosin 0.4milliGRAM(s) Oral at bedtime  midodrine 10milliGRAM(s) Oral every 8 hours  amiodarone    Tablet 400milliGRAM(s) Oral every 8 hours  oxyCODONE  5 mG/acetaminophen 325 mG 1Tablet(s) Oral every 4 hours PRN  enoxaparin Injectable 40milliGRAM(s) SubCutaneous once  warfarin 3milliGRAM(s) Oral once  potassium chloride    Tablet ER 20milliEquivalent(s) Oral once      PHYSICAL EXAM  General: well nourished, well developed, no acute distress  Neurology: alert and oriented x 3, nonfocal, no gross deficits  Respiratory: clear to auscultation bilaterally  CV: regular rate and rhythm, normal S1, S2  Abdomen: soft, nontender, nondistended, positive bowel sounds, last bowel movement 2/15  Extremities: warm, well perfused. no edema. + DP pulses  Incisions: midline sternal incision,  c/d/i. sternum stable. Left ACW PPM inc c/d/i    I & Os for 24h ending  @ 07:00  =============================================  IN: 120 ml / OUT: 0 ml / NET: 120 ml    I & Os for current day (as of  @ 09:54)  =============================================  IN: 290 ml / OUT: 0 ml / NET: 290 ml      Weights:  Daily     Daily Weight in k.1 (2017 05:40)  Admit Wt: Drug Dosing Weight  Height (cm): 167 (2017 07:40)  Weight (kg): 76.8 (2017 07:21)  BMI (kg/m2): 27.5 (2017 07:21)  BSA (m2): 1.86 (2017 07:21)    LABS  2017 04:59    134    |  94     |  14.0   ----------------------------<  99     4.0     |  29.0   |  0.74     Ca    8.8        2017 04:59  Phos  3.6       2017 17:22  Mg     1.7       2017 04:59                                   9.6    7.2   )-----------( 324      ( 2017 04:59 )             29.7          PT/INR - ( 2017 04:59 )   PT: 16.5 sec;   INR: 1.49 ratio         PTT - ( 2017 04:59 )  PTT:41.8 sec         Today's CXR: B/L atelectasis    Today's EKG: A sens V paced 76, QTc 611    TTE   Summary:   1. Mildly enlarged left atrium.   2. Global diffuse akinesis. Paradoxical septal motion consistent with   post-operative status. Left ventricular ejection fraction, by visual   estimation, is <20%.   3. The right atrium is normal in size.   4. The right ventricular size is normal. Moderately reduced RV systolic   function.   5. Mitral prosthesis is functioning normally.   6. Aortic valve is normally functioning bioprosthetic valve.   7. There is no evidence of pericardial effusion.    PAST MEDICAL & SURGICAL HISTORY:  Mitral valve insufficiency, unspecified etiology  Aortic valve insufficiency, unspecified etiology  HFrEF (heart failure with reduced ejection fraction)  No significant past surgical history      ASSESSMENT  61 year old male with former smoker with no significant past medical history who went to [facility] on  for rapidly progressing SOB and occasional chest pain. Tx to [facility] on  for further work-up of acute systolic heart failure. normal coronaries on left heart cath. 2/3 chest CT revealed an ascending aortic aneurysm. Mod-severe AI and severe MR on JARVIS.  s/p AVR (T), MVR (T), ascending aortic aneurysm repair.     Post-op course c/b multiple rhythm disturbances including PAF, CHB, junctional, and NSVT.  Right pigtail for recurrent/persistent pleural effusion d/c'd, s/p BiV ICD insertion (Medtronic).  Episode of VT requiring ICD shock - transferred to ICU, lidocaine given x 1, amiodarone load started. Albumin/midodrine required for hypotension. Transferred back to floor 2/15.    Plan  Pain control with Percocet prn  Continuous telemetry.   Continue amiodarone PO load - modify when discharged  Continue Coumadin for MVR - INR 1.49 today, will order 3 mg.   Continue midodrine for hypotension. Lopressor, lisinopril and aldactone all d/c'd  ICD care per EP, can shower five days postop. Completed antibiotics.  Atelectasis on chest xray - needs to ambulate, chest PT, incentive spirometry. Check daily chest xray.  Continue stool softeners, Protonix for GI prophylaxis  Resume Lovenox today for DVT prophylaxis  Stable post-op acute blood loss anemia  Replete magnesium, potassium  PT as tolerated, ambulate TID  Plan for D/C home tomorrow  Echo tomorrow if INR therapeutic?  Discussed with Dr. Carter and CTS team in AM

## 2017-02-17 VITALS
HEART RATE: 80 BPM | RESPIRATION RATE: 16 BRPM | DIASTOLIC BLOOD PRESSURE: 70 MMHG | SYSTOLIC BLOOD PRESSURE: 102 MMHG | OXYGEN SATURATION: 98 % | TEMPERATURE: 98 F

## 2017-02-17 LAB
ANION GAP SERPL CALC-SCNC: 13 MMOL/L — SIGNIFICANT CHANGE UP (ref 5–17)
BASOPHILS # BLD AUTO: 0 K/UL — SIGNIFICANT CHANGE UP (ref 0–0.2)
BASOPHILS NFR BLD AUTO: 0.1 % — SIGNIFICANT CHANGE UP (ref 0–2)
BUN SERPL-MCNC: 18 MG/DL — SIGNIFICANT CHANGE UP (ref 8–20)
CALCIUM SERPL-MCNC: 9.3 MG/DL — SIGNIFICANT CHANGE UP (ref 8.6–10.2)
CHLORIDE SERPL-SCNC: 97 MMOL/L — LOW (ref 98–107)
CO2 SERPL-SCNC: 29 MMOL/L — SIGNIFICANT CHANGE UP (ref 22–29)
CREAT SERPL-MCNC: 0.8 MG/DL — SIGNIFICANT CHANGE UP (ref 0.5–1.3)
EOSINOPHIL # BLD AUTO: 0.2 K/UL — SIGNIFICANT CHANGE UP (ref 0–0.5)
EOSINOPHIL NFR BLD AUTO: 2.4 % — SIGNIFICANT CHANGE UP (ref 0–5)
GLUCOSE SERPL-MCNC: 106 MG/DL — SIGNIFICANT CHANGE UP (ref 70–115)
HCT VFR BLD CALC: 31 % — LOW (ref 42–52)
HGB BLD-MCNC: 9.8 G/DL — LOW (ref 14–18)
INR BLD: 1.73 RATIO — HIGH (ref 0.88–1.16)
LYMPHOCYTES # BLD AUTO: 1.5 K/UL — SIGNIFICANT CHANGE UP (ref 1–4.8)
LYMPHOCYTES # BLD AUTO: 21 % — SIGNIFICANT CHANGE UP (ref 20–55)
MAGNESIUM SERPL-MCNC: 1.8 MG/DL — SIGNIFICANT CHANGE UP (ref 1.8–2.5)
MCHC RBC-ENTMCNC: 28.2 PG — SIGNIFICANT CHANGE UP (ref 27–31)
MCHC RBC-ENTMCNC: 31.6 G/DL — LOW (ref 32–36)
MCV RBC AUTO: 89.1 FL — SIGNIFICANT CHANGE UP (ref 80–94)
MONOCYTES # BLD AUTO: 0.7 K/UL — SIGNIFICANT CHANGE UP (ref 0–0.8)
MONOCYTES NFR BLD AUTO: 9.8 % — SIGNIFICANT CHANGE UP (ref 3–10)
NEUTROPHILS # BLD AUTO: 4.6 K/UL — SIGNIFICANT CHANGE UP (ref 1.8–8)
NEUTROPHILS NFR BLD AUTO: 65.8 % — SIGNIFICANT CHANGE UP (ref 37–73)
PHOSPHATE SERPL-MCNC: 4.2 MG/DL — SIGNIFICANT CHANGE UP (ref 2.4–4.7)
PLATELET # BLD AUTO: 360 K/UL — SIGNIFICANT CHANGE UP (ref 150–400)
POTASSIUM SERPL-MCNC: 4.2 MMOL/L — SIGNIFICANT CHANGE UP (ref 3.5–5.3)
POTASSIUM SERPL-SCNC: 4.2 MMOL/L — SIGNIFICANT CHANGE UP (ref 3.5–5.3)
PROTHROM AB SERPL-ACNC: 19.1 SEC — HIGH (ref 10–13.1)
RBC # BLD: 3.48 M/UL — LOW (ref 4.6–6.2)
RBC # FLD: 14.8 % — SIGNIFICANT CHANGE UP (ref 11–15.6)
SODIUM SERPL-SCNC: 139 MMOL/L — SIGNIFICANT CHANGE UP (ref 135–145)
WBC # BLD: 7 K/UL — SIGNIFICANT CHANGE UP (ref 4.8–10.8)
WBC # FLD AUTO: 7 K/UL — SIGNIFICANT CHANGE UP (ref 4.8–10.8)

## 2017-02-17 PROCEDURE — 93010 ELECTROCARDIOGRAM REPORT: CPT

## 2017-02-17 PROCEDURE — 71010: CPT | Mod: 26

## 2017-02-17 RX ORDER — LISINOPRIL 2.5 MG/1
1 TABLET ORAL
Qty: 30 | Refills: 1 | OUTPATIENT
Start: 2017-02-17 | End: 2017-04-17

## 2017-02-17 RX ORDER — MAGNESIUM SULFATE 500 MG/ML
2 VIAL (ML) INJECTION ONCE
Qty: 0 | Refills: 0 | Status: COMPLETED | OUTPATIENT
Start: 2017-02-17 | End: 2017-02-17

## 2017-02-17 RX ORDER — FUROSEMIDE 40 MG
1 TABLET ORAL
Qty: 14 | Refills: 0 | OUTPATIENT
Start: 2017-02-17 | End: 2017-03-03

## 2017-02-17 RX ORDER — TAMSULOSIN HYDROCHLORIDE 0.4 MG/1
1 CAPSULE ORAL
Qty: 30 | Refills: 0 | OUTPATIENT
Start: 2017-02-17 | End: 2017-03-19

## 2017-02-17 RX ORDER — PANTOPRAZOLE SODIUM 20 MG/1
1 TABLET, DELAYED RELEASE ORAL
Qty: 14 | Refills: 0 | OUTPATIENT
Start: 2017-02-17 | End: 2017-03-03

## 2017-02-17 RX ORDER — MAGNESIUM OXIDE 400 MG ORAL TABLET 241.3 MG
400 TABLET ORAL
Qty: 0 | Refills: 0 | Status: DISCONTINUED | OUTPATIENT
Start: 2017-02-17 | End: 2017-02-17

## 2017-02-17 RX ORDER — MAGNESIUM OXIDE 400 MG ORAL TABLET 241.3 MG
1 TABLET ORAL
Qty: 0 | Refills: 0 | COMMUNITY
Start: 2017-02-17

## 2017-02-17 RX ORDER — ASPIRIN/CALCIUM CARB/MAGNESIUM 324 MG
1 TABLET ORAL
Qty: 0 | Refills: 0 | COMMUNITY
Start: 2017-02-17

## 2017-02-17 RX ORDER — METOPROLOL TARTRATE 50 MG
0.5 TABLET ORAL
Qty: 30 | Refills: 0 | OUTPATIENT
Start: 2017-02-17 | End: 2017-03-19

## 2017-02-17 RX ORDER — WARFARIN SODIUM 2.5 MG/1
3 TABLET ORAL ONCE
Qty: 0 | Refills: 0 | Status: COMPLETED | OUTPATIENT
Start: 2017-02-17 | End: 2017-02-17

## 2017-02-17 RX ORDER — ASPIRIN/CALCIUM CARB/MAGNESIUM 324 MG
81 TABLET ORAL DAILY
Qty: 0 | Refills: 0 | Status: DISCONTINUED | OUTPATIENT
Start: 2017-02-17 | End: 2017-02-17

## 2017-02-17 RX ORDER — AMIODARONE HYDROCHLORIDE 400 MG/1
1 TABLET ORAL
Qty: 30 | Refills: 2 | OUTPATIENT
Start: 2017-02-17 | End: 2017-05-17

## 2017-02-17 RX ORDER — WARFARIN SODIUM 2.5 MG/1
1 TABLET ORAL
Qty: 30 | Refills: 1 | OUTPATIENT
Start: 2017-02-17 | End: 2017-04-17

## 2017-02-17 RX ADMIN — SODIUM CHLORIDE 3 MILLILITER(S): 9 INJECTION INTRAMUSCULAR; INTRAVENOUS; SUBCUTANEOUS at 05:57

## 2017-02-17 RX ADMIN — Medication 50 GRAM(S): at 07:55

## 2017-02-17 RX ADMIN — Medication 100 MILLIGRAM(S): at 05:58

## 2017-02-17 RX ADMIN — Medication 100 MILLIGRAM(S): at 12:08

## 2017-02-17 RX ADMIN — Medication 12.5 MILLIGRAM(S): at 05:58

## 2017-02-17 RX ADMIN — MAGNESIUM OXIDE 400 MG ORAL TABLET 400 MILLIGRAM(S): 241.3 TABLET ORAL at 12:09

## 2017-02-17 RX ADMIN — LISINOPRIL 2.5 MILLIGRAM(S): 2.5 TABLET ORAL at 05:58

## 2017-02-17 RX ADMIN — Medication 81 MILLIGRAM(S): at 12:08

## 2017-02-17 RX ADMIN — AMIODARONE HYDROCHLORIDE 400 MILLIGRAM(S): 400 TABLET ORAL at 05:58

## 2017-02-17 RX ADMIN — PANTOPRAZOLE SODIUM 40 MILLIGRAM(S): 20 TABLET, DELAYED RELEASE ORAL at 05:58

## 2017-02-17 RX ADMIN — WARFARIN SODIUM 3 MILLIGRAM(S): 2.5 TABLET ORAL at 07:56

## 2017-02-17 RX ADMIN — Medication 40 MILLIGRAM(S): at 05:58

## 2017-02-17 NOTE — PROGRESS NOTE ADULT - PROVIDER SPECIALTY LIST ADULT
CT Surgery
Cardiology
Electrophysiology
Heme/Onc
Hospitalist
Intervent Cardiology
Electrophysiology

## 2017-02-17 NOTE — PROGRESS NOTE ADULT - SUBJECTIVE AND OBJECTIVE BOX
Subjective: Interviewed w/  #[id number]  "Im ready to leave...I have to get a few things straightened out"  Pt denies pain, shortness of breath    Tele:  AVPace 70's  Vital Signs Last 24 Hrs  T(C): 36.8, Max: 37.3 ( @ 20:58)  T(F): 98.3, Max: 99.1 ( @ 20:58)  HR: 75 (73 - 80)  BP: 104/73 (102/84 - 116/68)  BP(mean): 88 (81 - 88)  RR: 16 (15 - 18)  SpO2: 96% (95% - 97%)                  LV EF: 20%  Labs:  2017 05:06    139    |  97     |  18.0   ----------------------------<  106    4.2     |  29.0   |  0.80     Ca    9.3        2017 05:06  Phos  4.2       2017 05:06  Mg     1.8       2017 05:06                               9.8    7.0   )-----------( 360      ( 2017 05:06 )             31.0       PT/INR - ( 2017 05:06 )   PT: 19.1 sec;   INR: 1.73 ratio         PTT - ( 2017 04:59 )  PTT:41.8 sec          CXR:Decreasing atelectasis with improved aeration at the right lung base,   otherwise stable.    Wt:Daily     Daily Weight in k (2017 05:52)    CHEST TUBE:  [ ] YES [x ] NO  OUTPUT:     per 24 hours    AIR LEAKS:  [ ] YES [ ] NO      KYLIE DRAIN:   [ ] YES [x ] NO  OUTPUT:     per 24 hours    EPICARDIAL WIRES:  [ ] YES [ x] NO      BOWEL MOVEMENT:  [x ] YES [ ] NO      MEDICATIONS  (STANDING):  docusate sodium 100milliGRAM(s) Oral three times a day  pantoprazole    Tablet 40milliGRAM(s) Oral before breakfast  furosemide    Tablet 40milliGRAM(s) Oral daily  sodium chloride 0.9% lock flush 3milliLiter(s) IV Push every 8 hours  tamsulosin 0.4milliGRAM(s) Oral at bedtime  amiodarone    Tablet 400milliGRAM(s) Oral every 12 hours  metoprolol 12.5milliGRAM(s) Oral two times a day  lisinopril 2.5milliGRAM(s) Oral daily  aspirin enteric coated 81milliGRAM(s) Oral daily  magnesium oxide 400milliGRAM(s) Oral three times a day with meals    MEDICATIONS  (PRN):  oxyCODONE  5 mG/acetaminophen 325 mG 1Tablet(s) Oral every 4 hours PRN Moderate Pain (4 - 6)      Physical Exam:    Neuro: alert. no deficits    Pulm: clear bilaterally, room air    CV: S1S2 RRR    Abd: softly distended, non tender    Extremities: no edema, no calf pain    Incision(s): sternum stable, incision clean L infraclavicular pocket w/ steri strips  clean  EKG: AVPace 65   TTE:  17   1. Mildly enlarged left atrium.   2. Global diffuse akinesis. Paradoxical septal motion consistent with   post-operative status. Left ventricular ejection fraction, by visual   estimation, is <20%.   3. The right atrium is normal in size.   4. The right ventricular size is normal. Moderately reduced RV systolic   function.   5. Mitral prosthesis is functioning normally.   6. Aortic valve is normally functioning bioprosthetic valve.   7. There is no evidence of pericardial effusion.          PAST MEDICAL & SURGICAL HISTORY:  Mitral valve insufficiency, unspecified etiology  Aortic valve insufficiency, unspecified etiology  HFrEF (heart failure with reduced ejection fraction)  No significant past surgical history      Assessment:    61 year old male with former smoker with no significant past medical history who went to [facility] on  for rapidly progressing SOB and occasional chest pain. Tx to [facility] on  for further work-up of acute systolic heart failure. normal coronaries on left heart cath. 2/3 chest CT revealed an ascending aortic aneurysm. Mod-severe AI and severe MR on JARVIS.  s/p AVR (T), MVR (T), ascending aortic aneurysm repair.     Post-op course c/b multiple rhythm disturbances including PAF, CHB, junctional, and NSVT.  Right pigtail for recurrent/persistent pleural effusion d/c'd, s/p BiV ICD insertion (Medtronic).  Episode of VT requiring ICD shock - transferred to ICU, lidocaine given x 1, amiodarone load started. Albumin/midodrine required for hypotension. Transferred back to floor 2/15.Betablocker resumed by cardiology               Plan:     Continue amiodarone 200 qd on discharge x 3 months as per Dr Carter  Continue Coumadin for MVR - Dr Bravo to monitor(Spoke w/ him @ [phone])   Lopressor afib/rate control   lisinopril for reduced EF  ICD care   Discharge on daily  magnesium  Plan for D/C home today  Discussed with Dr. Carter

## 2017-02-28 PROBLEM — I71.9 AORTIC ANEURYSM: Status: ACTIVE | Noted: 2017-02-28

## 2017-02-28 PROBLEM — I34.0 MITRAL REGURGITATION: Status: ACTIVE | Noted: 2017-02-28

## 2017-02-28 PROBLEM — I35.0 AORTIC VALVE STENOSIS: Status: ACTIVE | Noted: 2017-02-28

## 2017-02-28 PROBLEM — Z87.891 FORMER SMOKER: Status: ACTIVE | Noted: 2017-02-28

## 2017-02-28 PROBLEM — Z87.438 HISTORY OF BPH: Status: RESOLVED | Noted: 2017-02-28 | Resolved: 2017-02-28

## 2017-02-28 RX ORDER — AMIODARONE HYDROCHLORIDE 200 MG/1
200 TABLET ORAL
Refills: 0 | Status: ACTIVE | COMMUNITY

## 2017-02-28 RX ORDER — MAGNESIUM OXIDE 400 MG
400 (241.3 MG) TABLET ORAL
Refills: 0 | Status: ACTIVE | COMMUNITY

## 2017-02-28 RX ORDER — LISINOPRIL 2.5 MG/1
2.5 TABLET ORAL
Refills: 0 | Status: ACTIVE | COMMUNITY

## 2017-02-28 RX ORDER — ASPIRIN 81 MG
81 TABLET, DELAYED RELEASE (ENTERIC COATED) ORAL
Refills: 0 | Status: ACTIVE | COMMUNITY

## 2017-03-01 ENCOUNTER — APPOINTMENT (OUTPATIENT)
Dept: ELECTROPHYSIOLOGY | Facility: CLINIC | Age: 62
End: 2017-03-01

## 2017-03-03 ENCOUNTER — RECORD ABSTRACTING (OUTPATIENT)
Age: 62
End: 2017-03-03

## 2017-03-03 ENCOUNTER — APPOINTMENT (OUTPATIENT)
Dept: CARDIOTHORACIC SURGERY | Facility: CLINIC | Age: 62
End: 2017-03-03

## 2017-03-03 VITALS
OXYGEN SATURATION: 97 % | DIASTOLIC BLOOD PRESSURE: 66 MMHG | BODY MASS INDEX: 25.39 KG/M2 | HEART RATE: 83 BPM | HEIGHT: 66 IN | TEMPERATURE: 97.7 F | RESPIRATION RATE: 14 BRPM | SYSTOLIC BLOOD PRESSURE: 94 MMHG | WEIGHT: 158 LBS

## 2017-03-03 VITALS — DIASTOLIC BLOOD PRESSURE: 71 MMHG | SYSTOLIC BLOOD PRESSURE: 105 MMHG

## 2017-03-03 DIAGNOSIS — I71.9 AORTIC ANEURYSM OF UNSPECIFIED SITE, W/OUT RUPTURE: ICD-10-CM

## 2017-03-03 DIAGNOSIS — Z09 ENCOUNTER FOR FOLLOW-UP EXAMINATION AFTER COMPLETED TREATMENT FOR CONDITIONS OTHER THAN MALIGNANT NEOPLASM: ICD-10-CM

## 2017-03-03 DIAGNOSIS — I34.0 NONRHEUMATIC MITRAL (VALVE) INSUFFICIENCY: ICD-10-CM

## 2017-03-03 DIAGNOSIS — Z87.438 PERSONAL HISTORY OF OTHER DISEASES OF MALE GENITAL ORGANS: ICD-10-CM

## 2017-03-03 DIAGNOSIS — Z95.2 PRESENCE OF PROSTHETIC HEART VALVE: ICD-10-CM

## 2017-03-03 DIAGNOSIS — I35.0 NONRHEUMATIC AORTIC (VALVE) STENOSIS: ICD-10-CM

## 2017-03-03 DIAGNOSIS — Z95.4 PRESENCE OF OTHER HEART-VALVE REPLACEMENT: ICD-10-CM

## 2017-03-03 DIAGNOSIS — Z87.891 PERSONAL HISTORY OF NICOTINE DEPENDENCE: ICD-10-CM

## 2017-03-03 RX ORDER — OXYCODONE HYDROCHLORIDE AND ACETAMINOPHEN 5; 325 MG/1; MG/1
5-325 TABLET ORAL
Qty: 20 | Refills: 0 | Status: ACTIVE | COMMUNITY

## 2017-03-03 RX ORDER — TAMSULOSIN HCL 0.4 MG
0.4 CAPSULE ORAL
Refills: 0 | Status: DISCONTINUED | COMMUNITY
End: 2017-03-03

## 2017-03-03 RX ORDER — PANTOPRAZOLE SODIUM 40 MG/1
40 GRANULE, DELAYED RELEASE ORAL
Refills: 0 | Status: DISCONTINUED | COMMUNITY
End: 2017-03-03

## 2017-03-03 RX ORDER — FUROSEMIDE 40 MG/1
40 TABLET ORAL
Refills: 0 | Status: DISCONTINUED | COMMUNITY
End: 2017-03-03

## 2017-03-03 RX ORDER — METOPROLOL TARTRATE 25 MG/1
25 TABLET, FILM COATED ORAL TWICE DAILY
Qty: 30 | Refills: 0 | Status: ACTIVE | COMMUNITY

## 2017-03-03 RX ORDER — METOPROLOL TARTRATE 50 MG/1
TABLET ORAL
Refills: 0 | Status: DISCONTINUED | COMMUNITY
End: 2017-03-03

## 2017-03-03 RX ORDER — WARFARIN SODIUM 2 MG/1
2 TABLET ORAL DAILY
Refills: 0 | Status: ACTIVE | COMMUNITY

## 2017-03-07 ENCOUNTER — APPOINTMENT (OUTPATIENT)
Dept: ELECTROPHYSIOLOGY | Facility: CLINIC | Age: 62
End: 2017-03-07

## 2017-03-07 ENCOUNTER — NON-APPOINTMENT (OUTPATIENT)
Age: 62
End: 2017-03-07

## 2017-03-07 VITALS — HEART RATE: 90 BPM | DIASTOLIC BLOOD PRESSURE: 75 MMHG | OXYGEN SATURATION: 97 % | SYSTOLIC BLOOD PRESSURE: 109 MMHG

## 2017-03-12 PROCEDURE — 83605 ASSAY OF LACTIC ACID: CPT

## 2017-03-12 PROCEDURE — 93320 DOPPLER ECHO COMPLETE: CPT

## 2017-03-12 PROCEDURE — 82803 BLOOD GASES ANY COMBINATION: CPT

## 2017-03-12 PROCEDURE — 97163 PT EVAL HIGH COMPLEX 45 MIN: CPT

## 2017-03-12 PROCEDURE — 86920 COMPATIBILITY TEST SPIN: CPT

## 2017-03-12 PROCEDURE — 84100 ASSAY OF PHOSPHORUS: CPT

## 2017-03-12 PROCEDURE — 99231 SBSQ HOSP IP/OBS SF/LOW 25: CPT

## 2017-03-12 PROCEDURE — 82330 ASSAY OF CALCIUM: CPT

## 2017-03-12 PROCEDURE — 97110 THERAPEUTIC EXERCISES: CPT

## 2017-03-12 PROCEDURE — 80048 BASIC METABOLIC PNL TOTAL CA: CPT

## 2017-03-12 PROCEDURE — 71045 X-RAY EXAM CHEST 1 VIEW: CPT

## 2017-03-12 PROCEDURE — 93460 R&L HRT ART/VENTRICLE ANGIO: CPT

## 2017-03-12 PROCEDURE — 80053 COMPREHEN METABOLIC PANEL: CPT

## 2017-03-12 PROCEDURE — C1769: CPT

## 2017-03-12 PROCEDURE — 93306 TTE W/DOPPLER COMPLETE: CPT

## 2017-03-12 PROCEDURE — 84134 ASSAY OF PREALBUMIN: CPT

## 2017-03-12 PROCEDURE — 80061 LIPID PANEL: CPT

## 2017-03-12 PROCEDURE — 86900 BLOOD TYPING SEROLOGIC ABO: CPT

## 2017-03-12 PROCEDURE — 86965 POOLING BLOOD PLATELETS: CPT

## 2017-03-12 PROCEDURE — 80076 HEPATIC FUNCTION PANEL: CPT

## 2017-03-12 PROCEDURE — C1892: CPT

## 2017-03-12 PROCEDURE — 82947 ASSAY GLUCOSE BLOOD QUANT: CPT

## 2017-03-12 PROCEDURE — P9012: CPT

## 2017-03-12 PROCEDURE — C1777: CPT

## 2017-03-12 PROCEDURE — 36430 TRANSFUSION BLD/BLD COMPNT: CPT

## 2017-03-12 PROCEDURE — 85730 THROMBOPLASTIN TIME PARTIAL: CPT

## 2017-03-12 PROCEDURE — 85610 PROTHROMBIN TIME: CPT

## 2017-03-12 PROCEDURE — 81001 URINALYSIS AUTO W/SCOPE: CPT

## 2017-03-12 PROCEDURE — 87641 MR-STAPH DNA AMP PROBE: CPT

## 2017-03-12 PROCEDURE — P9045: CPT

## 2017-03-12 PROCEDURE — C1760: CPT

## 2017-03-12 PROCEDURE — 83735 ASSAY OF MAGNESIUM: CPT

## 2017-03-12 PROCEDURE — 82435 ASSAY OF BLOOD CHLORIDE: CPT

## 2017-03-12 PROCEDURE — 88304 TISSUE EXAM BY PATHOLOGIST: CPT

## 2017-03-12 PROCEDURE — 93005 ELECTROCARDIOGRAM TRACING: CPT

## 2017-03-12 PROCEDURE — 93880 EXTRACRANIAL BILAT STUDY: CPT

## 2017-03-12 PROCEDURE — 86850 RBC ANTIBODY SCREEN: CPT

## 2017-03-12 PROCEDURE — 85027 COMPLETE CBC AUTOMATED: CPT

## 2017-03-12 PROCEDURE — 71260 CT THORAX DX C+: CPT

## 2017-03-12 PROCEDURE — 94002 VENT MGMT INPAT INIT DAY: CPT

## 2017-03-12 PROCEDURE — 82248 BILIRUBIN DIRECT: CPT

## 2017-03-12 PROCEDURE — 86901 BLOOD TYPING SEROLOGIC RH(D): CPT

## 2017-03-12 PROCEDURE — 94003 VENT MGMT INPAT SUBQ DAY: CPT

## 2017-03-12 PROCEDURE — 84443 ASSAY THYROID STIM HORMONE: CPT

## 2017-03-12 PROCEDURE — 93567 NJX CAR CTH SPRVLV AORTGRPHY: CPT

## 2017-03-12 PROCEDURE — 82746 ASSAY OF FOLIC ACID SERUM: CPT

## 2017-03-12 PROCEDURE — C1898: CPT

## 2017-03-12 PROCEDURE — 88311 DECALCIFY TISSUE: CPT

## 2017-03-12 PROCEDURE — C1768: CPT

## 2017-03-12 PROCEDURE — 71046 X-RAY EXAM CHEST 2 VIEWS: CPT

## 2017-03-12 PROCEDURE — 93325 DOPPLER ECHO COLOR FLOW MAPG: CPT

## 2017-03-12 PROCEDURE — C1889: CPT

## 2017-03-12 PROCEDURE — 84484 ASSAY OF TROPONIN QUANT: CPT

## 2017-03-12 PROCEDURE — 82550 ASSAY OF CK (CPK): CPT

## 2017-03-12 PROCEDURE — 85384 FIBRINOGEN ACTIVITY: CPT

## 2017-03-12 PROCEDURE — 82607 VITAMIN B-12: CPT

## 2017-03-12 PROCEDURE — 88305 TISSUE EXAM BY PATHOLOGIST: CPT

## 2017-03-12 PROCEDURE — 84132 ASSAY OF SERUM POTASSIUM: CPT

## 2017-03-12 PROCEDURE — 82553 CREATINE MB FRACTION: CPT

## 2017-03-12 PROCEDURE — 36415 COLL VENOUS BLD VENIPUNCTURE: CPT

## 2017-03-12 PROCEDURE — 74020: CPT

## 2017-03-12 PROCEDURE — 93308 TTE F-UP OR LMTD: CPT

## 2017-03-12 PROCEDURE — 84295 ASSAY OF SERUM SODIUM: CPT

## 2017-03-12 PROCEDURE — 84439 ASSAY OF FREE THYROXINE: CPT

## 2017-03-12 PROCEDURE — 87640 STAPH A DNA AMP PROBE: CPT

## 2017-03-12 PROCEDURE — 93312 ECHO TRANSESOPHAGEAL: CPT

## 2017-03-12 PROCEDURE — 94010 BREATHING CAPACITY TEST: CPT

## 2017-03-12 PROCEDURE — 97116 GAIT TRAINING THERAPY: CPT

## 2017-03-12 PROCEDURE — P9037: CPT

## 2017-03-12 PROCEDURE — 85045 AUTOMATED RETICULOCYTE COUNT: CPT

## 2017-03-12 PROCEDURE — 83880 ASSAY OF NATRIURETIC PEPTIDE: CPT

## 2017-03-12 PROCEDURE — C1887: CPT

## 2017-03-12 PROCEDURE — 83036 HEMOGLOBIN GLYCOSYLATED A1C: CPT

## 2017-03-12 PROCEDURE — C1894: CPT

## 2017-03-12 PROCEDURE — 85014 HEMATOCRIT: CPT

## 2017-03-12 PROCEDURE — 97530 THERAPEUTIC ACTIVITIES: CPT

## 2017-09-28 NOTE — PRE-OP CHECKLIST - AICD PRESENT
Arrives to  [facility] for evaluation of right clavicle injury while playing football. Was tackled and drove into the ground per dad. Deformity noticed to the right clavicle and ice applied. Denies numbness and tingling to the right arm. Radial pulses equal and strong bilateral. Waiting provider to assess for further orders.
no

## 2020-01-01 NOTE — PROGRESS NOTE ADULT - PROBLEM SELECTOR PROBLEM 2
DISPLAY PLAN FREE TEXT
Acute on chronic systolic heart failure
Aortic valve insufficiency, unspecified etiology
Nonischemic cardiomyopathy
Ventricular tachycardia

## 2022-10-28 NOTE — PROGRESS NOTE ADULT - SUBJECTIVE AND OBJECTIVE BOX
well developed, well nourished , in no acute distress INTERVAL HISTORY:  Feels much better  No further arrhythmias  On Amio load  	  MEDICATIONS:  furosemide    Tablet 40milliGRAM(s) Oral daily  tamsulosin 0.4milliGRAM(s) Oral at bedtime  midodrine 10milliGRAM(s) Oral every 8 hours  amiodarone    Tablet 400milliGRAM(s) Oral every 8 hours        oxyCODONE  5 mG/acetaminophen 325 mG 2Tablet(s) Oral every 6 hours PRN  oxyCODONE  5 mG/acetaminophen 325 mG 1Tablet(s) Oral every 4 hours PRN    docusate sodium 100milliGRAM(s) Oral three times a day  pantoprazole    Tablet 40milliGRAM(s) Oral before breakfast      aspirin enteric coated 325milliGRAM(s) Oral daily  warfarin 2milliGRAM(s) Oral once        PHYSICAL EXAM:    T(C): 36.2, Max: 37.1 (02-15 @ 07:13)  HR: 77 (67 - 88)  BP: 118/58 (93/61 - 122/70)  RR: 18 (11 - 26)  SpO2: 99% (94% - 99%)  Wt(kg): --    I&O's Summary    I & Os for current day (as of 15 Feb 2017 15:30)  =============================================  IN: 1590 ml / OUT: 2700 ml / NET: -1110 ml      Daily     Daily     Appearance: Normal	  HEENT:   Normal oral mucosa, PERRL, EOMI	  Lymphatic: No lymphadenopathy  Cardiovascular: Normal S1 S2, No JVD, No murmurs, No edema  Respiratory: Lungs clear to auscultation	  Psychiatry: A & O x 3, Mood & affect appropriate  Gastrointestinal:  Soft, Non-tender, + BS	  Skin: No rashes, No ecchymoses, No cyanosis  Neurologic: Non-focal  Extremities: Normal range of motion, No clubbing, cyanosis or edema  Vascular: Peripheral pulses palpable 2+ bilaterally                          9.3    9.5   )-----------( 298      ( 15 Feb 2017 03:33 )             29.1     15 Feb 2017 03:33    134    |  95     |  20.0   ----------------------------<  108    4.0     |  28.0   |  0.61     Ca    8.6        15 Feb 2017 03:33  Phos  3.6       14 Feb 2017 17:22  Mg     2.0       15 Feb 2017 03:33        ASSESSMENT/PLAN: 	    Discussed ?? need for Midodrine with CT surgical staff  Will plan on starting on beta blockers and ACE inhibitors as tolerated by his BP due to his LV dysfunction.  Maintenance Amio after load.

## 2024-03-26 NOTE — PROGRESS NOTE ADULT - PROBLEM SELECTOR PLAN 3
Raul cardiosurgery planned AVR
for valve replacement sugery tomorrow
-unclear etiology  -cont all cardiac meds
1. Maintain hemodynamic stability  2. Plan for valve replacement on Monday
1. Maintain hemodynamic stability  2. Plan for valve replacement on Monday.
CRT for decreased EF/heart block.  F/U with EP service as out patient.
No

## 2025-06-15 NOTE — H&P ADULT. - PROBLEM/PLAN-1
- Patient was febrile in ER, but other infectious labs appear normal  - Will continue zosyn for now  - Consult to SLP for evaluation given concern for aspiration   DISPLAY PLAN FREE TEXT